# Patient Record
Sex: FEMALE | Race: WHITE | Employment: OTHER | URBAN - METROPOLITAN AREA
[De-identification: names, ages, dates, MRNs, and addresses within clinical notes are randomized per-mention and may not be internally consistent; named-entity substitution may affect disease eponyms.]

---

## 2017-01-12 ENCOUNTER — MEDICAL CORRESPONDENCE (OUTPATIENT)
Dept: HEALTH INFORMATION MANAGEMENT | Facility: CLINIC | Age: 47
End: 2017-01-12

## 2017-01-20 ENCOUNTER — HOSPITAL ENCOUNTER (OUTPATIENT)
Dept: ULTRASOUND IMAGING | Facility: CLINIC | Age: 47
Discharge: HOME | End: 2017-01-20
Attending: SPECIALIST | Admitting: SPECIALIST
Payer: COMMERCIAL

## 2017-01-20 DIAGNOSIS — E04.2 MULTINODULAR GOITER: ICD-10-CM

## 2017-01-20 PROCEDURE — 76536 US EXAM OF HEAD AND NECK: CPT

## 2019-03-15 NOTE — PROGRESS NOTES
SUBJECTIVE:   CC: Kenneth Bañuelos is an 48 year old woman who presents for preventive health visit.     Physical   Annual:     Getting at least 3 servings of Calcium per day:  Yes    Bi-annual eye exam:  NO    Dental care twice a year:  NO    Sleep apnea or symptoms of sleep apnea:  Daytime drowsiness    Diet:  Vegetarian/vegan    Frequency of exercise:  2-3 days/week    Duration of exercise:  45-60 minutes    Taking medications regularly:  Yes    Medication side effects:  None    Additional concerns today:  No    PHQ-2 Total Score: 0    She has a long history of difficulty with sleeping.  She tried Ambien in the past but the immediate release Ambien did not allow her to sleep through the entire night so she tried extended release Ambien and it made her too sleepy in the morning.  She did try trazodone at one point in time and this worked well for her.  She would like to try trazodone again.    She is having pain in multiple joints and would like this evaluated.  We will evaluate this at a future visit due to time constraints today.    Today's PHQ-2 Score:   PHQ-2 (  [employer]) 3/20/2019   Q1: Little interest or pleasure in doing things 0   Q2: Feeling down, depressed or hopeless 0   PHQ-2 Score 0   Q1: Little interest or pleasure in doing things Not at all   Q2: Feeling down, depressed or hopeless Not at all   PHQ-2 Score 0       Abuse: Current or Past(Physical, Sexual or Emotional)- No  Do you feel safe in your environment? Yes    Social History     Tobacco Use     Smoking status: Former Smoker     Packs/day: 0.50     Years: 2.00     Pack years: 1.00     Last attempt to quit: 10/28/1989     Years since quittin.4     Smokeless tobacco: Never Used   Substance Use Topics     Alcohol use: Yes     Comment: red wine - few nights weekly     Alcohol Use 3/20/2019   If you drink alcohol do you typically have greater than 3 drinks per day OR greater than 7 drinks per week? Yes     Reviewed orders with patient.   Reviewed health maintenance and updated orders accordingly - Yes  Labs reviewed in EPIC  BP Readings from Last 3 Encounters:   19 122/84   01/27/15 119/77   01/12/15 112/77    Wt Readings from Last 3 Encounters:   19 78.2 kg (172 lb 6.4 oz)   01/27/15 74.9 kg (165 lb 3.2 oz)   01/12/15 76.7 kg (169 lb)                  Patient Active Problem List   Diagnosis     CARDIOVASCULAR SCREENING; LDL GOAL LESS THAN 160     Immunization not carried out because of patient refusal     Thyroid enlargement     Hashimoto's thyroiditis     Multinodular goiter     Vegetarianism     Bladder spasm     Urinary frequency     Sleep disturbance     History of vitamin D deficiency     History of non anemic vitamin B12 deficiency     Low back pain     Right leg paresthesias     Repetitive intrusions of sleep     Insomnia     Hypersomnia     Persistent disorder of initiating or maintaining sleep     Past Surgical History:   Procedure Laterality Date     RECONSTRUCT BREAST, IMPLANT PROSTHESIS, COMBINED       [facility]         Social History     Tobacco Use     Smoking status: Former Smoker     Packs/day: 0.50     Years: 2.00     Pack years: 1.00     Last attempt to quit: 10/28/1989     Years since quittin.4     Smokeless tobacco: Never Used   Substance Use Topics     Alcohol use: Yes     Comment: red wine - few nights weekly     Family History   Problem Relation Age of Onset     Diabetes Mother         30's insulin dependent     Circulatory Mother         heart valve replacement     Thyroid Disease Sister         Hashimoto's     Other - See Comments Sister         Insomnia     Cerebrovascular Disease No family hx of      Hypertension No family hx of          Current Outpatient Medications   Medication Sig Dispense Refill     Multiple Vitamin (MULTI-VITAMIN PO) Take  by mouth daily.       SYNTHROID 112 MCG tablet Take 112 mcg by mouth daily  1     traZODone (DESYREL) 50 MG tablet Take 1 tablet (50 mg) by mouth At  Bedtime May increase to 2 tablets daily. 90 tablet 3     levothyroxine (SYNTHROID) 100 MCG tablet Take 1 tablet by mouth daily. Dr. Brandon Fields       melatonin 3 MG tablet Take 1 tablet (3 mg) by mouth nightly as needed for sleep       No Known Allergies    Mammogram Screening: Patient over age 50, mutual decision to screen reflected in health maintenance.    Pertinent mammograms are reviewed under the imaging tab.  History of abnormal Pap smear: NO - age 30-65 PAP every 5 years with negative HPV co-testing recommended  PAP / HPV 2014   PAP NIL NIL     Reviewed and updated as needed this visit by clinical staff  Tobacco  Allergies  Meds  Med Hx  Surg Hx  Fam Hx  Soc Hx    iron    Reviewed and updated as needed this visit by Provider        Past Medical History:   Diagnosis Date     Hashimoto's disease      Hypersomnia, unspecified     sleep study     Immunization not carried out because of patient refusal 2011    tdap     Insomnia, unspecified     sleep study     Persistent disorder of initiating or maintaining sleep     sleep study     Repetitive intrusions of sleep     sleep study     Shingles 2008    hand - resolved without complications     Vitamin B12 deficiency 2012     Vitamin D deficiency       Past Surgical History:   Procedure Laterality Date     RECONSTRUCT BREAST, IMPLANT PROSTHESIS, COMBINED       WISDOM [facility]       Obstetric History       T2      L2     SAB3   TAB1   Ectopic0   Multiple0   Live Births0       # Outcome Date GA Lbr Celestino/2nd Weight Sex Delivery Anes PTL Lv   6 Term            5 Term            4 TAB            3 SAB            2 SAB            1 SAB                   Review of Systems   Constitutional: Negative for chills and fever.   HENT: Negative for congestion, ear pain, hearing loss and sore throat.    Eyes: Positive for visual disturbance. Negative for pain.   Respiratory: Negative for cough and shortness of breath.   "  Cardiovascular: Negative for chest pain, palpitations and peripheral edema.   Gastrointestinal: Negative for abdominal pain, constipation, diarrhea, heartburn, hematochezia and nausea.   Breasts:  Negative for tenderness, breast mass and discharge.   Genitourinary: Positive for frequency. Negative for dysuria, genital sores, hematuria, pelvic pain, urgency, vaginal bleeding and vaginal discharge.   Musculoskeletal: Positive for arthralgias. Negative for joint swelling and myalgias.   Skin: Negative for rash.   Neurological: Negative for dizziness, weakness, headaches and paresthesias.   Psychiatric/Behavioral: Negative for mood changes. The patient is nervous/anxious.         OBJECTIVE:   Resp 16   Ht 1.714 m (5' 7.48\")   Wt 78.2 kg (172 lb 6.4 oz)   LMP 03/04/2019 (Approximate)   BMI 26.62 kg/m    Physical Exam  GENERAL: healthy, alert and no distress  EYES: Eyes grossly normal to inspection, PERRL and conjunctivae and sclerae normal  HENT: ear canals and TM's normal, nose and mouth without ulcers or lesions  NECK: no adenopathy, no asymmetry, masses, or scars and thyroid normal to palpation  RESP: lungs clear to auscultation - no rales, rhonchi or wheezes  BREAST: normal without masses, tenderness or nipple discharge and no palpable axillary masses or adenopathy  CV: regular rate and rhythm, normal S1 S2, no S3 or S4, no murmur, click or rub, no peripheral edema and peripheral pulses strong  ABDOMEN: soft, nontender, no hepatosplenomegaly, no masses and bowel sounds normal   (female): normal female external genitalia, normal urethral meatus, vaginal mucosa pink, moist, well rugated, and normal cervix/adnexa/uterus without masses or discharge  MS: no gross musculoskeletal defects noted, no edema  SKIN: no suspicious lesions or rashes  NEURO: Normal strength and tone, mentation intact and speech normal  PSYCH: mentation appears normal, affect normal/bright, mildly anxious    Diagnostic Test Results:  No " "results found for this or any previous visit (from the past 24 hour(s)).    ASSESSMENT/PLAN:   1. Encounter for routine adult health examination without abnormal findings  Healthy female adult    2. Screen for colon cancer  - Fecal colorectal cancer screen (FIT); Future    3. Visit for screening mammogram  - MA SCREENING DIGITAL BILAT - Future  (s+30); Future    4. Lipid screening  - Lipid panel reflex to direct LDL Fasting; Future    5. Insomnia, unspecified type  Will restart trazodone. Follow up if medication is not effective.  - traZODone (DESYREL) 50 MG tablet; Take 1 tablet (50 mg) by mouth At Bedtime May increase to 2 tablets daily.  Dispense: 90 tablet; Refill: 3    6. Screening for malignant neoplasm of cervix  - Pap imaged thin layer screen with HPV - recommended age 30 - 65 years (select HPV order below)  - HPV High Risk Types DNA Cervical    7. Screening for diabetes mellitus  - **Basic metabolic panel FUTURE anytime; Future    8. Vitamin B12 deficiency (non anemic)  - Vitamin B12; Future    9. Vitamin D deficiency  - Vitamin D Deficiency; Future    10. Iron deficiency anemia secondary to inadequate dietary iron intake  - Iron and iron binding capacity; Future  - Ferritin; Future  - CBC with platelets; Future    11. Polyarthralgia  Will address at future visit.    COUNSELING:  Reviewed preventive health counseling, as reflected in patient instructions       Regular exercise       Healthy diet/nutrition       Immunizations    Declined: Influenza and Td due to Concerns about side effects/safety               Colon cancer screening    BP Readings from Last 1 Encounters:   01/27/15 119/77     Estimated body mass index is 26.62 kg/m  as calculated from the following:    Height as of this encounter: 1.714 m (5' 7.48\").    Weight as of this encounter: 78.2 kg (172 lb 6.4 oz).      Weight management plan: Discussed healthy diet and exercise guidelines     reports that she quit smoking about 29 years ago. She has " a 1.00 pack-year smoking history. she has never used smokeless tobacco.      Counseling Resources:  ATP IV Guidelines  Pooled Cohorts Equation Calculator  Breast Cancer Risk Calculator  FRAX Risk Assessment  ICSI Preventive Guidelines  Dietary Guidelines for Americans, 2010  USDA's MyPlate  ASA Prophylaxis  Lung CA Screening    JENNY Das [facility]

## 2019-03-15 NOTE — PATIENT INSTRUCTIONS
Trazodone prescription sent to the pharmacy. Start out with one tablet at bedtime. If needed you can increase the dose to 2 tablets at bedtime.    Blood work orders are in so that you can have these done when you go to the hospital for your thyroid ultrasound.  For the cholesterol test, you will need to fast for 10 hours beforehand. You can drink water and black coffee while fasting.    Schedule mammogram at the [facility] by calling [phone].    SARA SureshC    Preventive Health Recommendations  Female Ages 50 - 64    Yearly exam: See your health care provider every year in order to  o Review health changes.   o Discuss preventive care.    o Review your medicines if your doctor has prescribed any.      Get a Pap test every three years (unless you have an abnormal result and your provider advises testing more often).    If you get Pap tests with HPV test, you only need to test every 5 years, unless you have an abnormal result.     You do not need a Pap test if your uterus was removed (hysterectomy) and you have not had cancer.    You should be tested each year for STDs (sexually transmitted diseases) if you're at risk.     Have a mammogram every 1 to 2 years.    Have a colonoscopy at age 50, or have a yearly FIT test (stool test). These exams screen for colon cancer.      Have a cholesterol test every 5 years, or more often if advised.    Have a diabetes test (fasting glucose) every three years. If you are at risk for diabetes, you should have this test more often.     If you are at risk for osteoporosis (brittle bone disease), think about having a bone density scan (DEXA).    Shots: Get a flu shot each year. Get a tetanus shot every 10 years.    Nutrition:     Eat at least 5 servings of fruits and vegetables each day.    Eat whole-grain bread, whole-wheat pasta and brown rice instead of white grains and rice.    Get adequate Calcium and Vitamin D.     Lifestyle    Exercise at least  150 minutes a week (30 minutes a day, 5 days a week). This will help you control your weight and prevent disease.    Limit alcohol to one drink per day.    No smoking.     Wear sunscreen to prevent skin cancer.     See your dentist every six months for an exam and cleaning.    See your eye doctor every 1 to 2 years.

## 2019-03-20 ENCOUNTER — OFFICE VISIT (OUTPATIENT)
Dept: FAMILY MEDICINE | Facility: OTHER | Age: 49
End: 2019-03-20
Payer: COMMERCIAL

## 2019-03-20 VITALS
HEART RATE: 96 BPM | TEMPERATURE: 98.9 F | HEIGHT: 67 IN | SYSTOLIC BLOOD PRESSURE: 122 MMHG | OXYGEN SATURATION: 100 % | WEIGHT: 172.4 LBS | RESPIRATION RATE: 16 BRPM | BODY MASS INDEX: 27.06 KG/M2 | DIASTOLIC BLOOD PRESSURE: 84 MMHG

## 2019-03-20 DIAGNOSIS — E53.8 VITAMIN B12 DEFICIENCY (NON ANEMIC): ICD-10-CM

## 2019-03-20 DIAGNOSIS — M25.50 POLYARTHRALGIA: ICD-10-CM

## 2019-03-20 DIAGNOSIS — Z12.31 VISIT FOR SCREENING MAMMOGRAM: ICD-10-CM

## 2019-03-20 DIAGNOSIS — Z12.11 SCREEN FOR COLON CANCER: ICD-10-CM

## 2019-03-20 DIAGNOSIS — Z13.1 SCREENING FOR DIABETES MELLITUS: ICD-10-CM

## 2019-03-20 DIAGNOSIS — Z13.220 LIPID SCREENING: ICD-10-CM

## 2019-03-20 DIAGNOSIS — D50.8 IRON DEFICIENCY ANEMIA SECONDARY TO INADEQUATE DIETARY IRON INTAKE: ICD-10-CM

## 2019-03-20 DIAGNOSIS — Z00.00 ENCOUNTER FOR ROUTINE ADULT HEALTH EXAMINATION WITHOUT ABNORMAL FINDINGS: Primary | ICD-10-CM

## 2019-03-20 DIAGNOSIS — G47.00 INSOMNIA, UNSPECIFIED TYPE: ICD-10-CM

## 2019-03-20 DIAGNOSIS — E55.9 VITAMIN D DEFICIENCY: ICD-10-CM

## 2019-03-20 DIAGNOSIS — Z12.4 SCREENING FOR MALIGNANT NEOPLASM OF CERVIX: ICD-10-CM

## 2019-03-20 PROCEDURE — G0145 SCR C/V CYTO,THINLAYER,RESCR: HCPCS | Performed by: STUDENT IN AN ORGANIZED HEALTH CARE EDUCATION/TRAINING PROGRAM

## 2019-03-20 PROCEDURE — 99386 PREV VISIT NEW AGE 40-64: CPT | Performed by: STUDENT IN AN ORGANIZED HEALTH CARE EDUCATION/TRAINING PROGRAM

## 2019-03-20 PROCEDURE — 87624 HPV HI-RISK TYP POOLED RSLT: CPT | Performed by: STUDENT IN AN ORGANIZED HEALTH CARE EDUCATION/TRAINING PROGRAM

## 2019-03-20 RX ORDER — LEVOTHYROXINE SODIUM 112 MCG
112 TABLET ORAL DAILY
Refills: 1 | COMMUNITY
Start: 2019-02-06 | End: 2019-04-15

## 2019-03-20 RX ORDER — TRAZODONE HYDROCHLORIDE 50 MG/1
50 TABLET, FILM COATED ORAL AT BEDTIME
Qty: 90 TABLET | Refills: 3 | Status: SHIPPED | OUTPATIENT
Start: 2019-03-20 | End: 2019-04-15 | Stop reason: SINTOL

## 2019-03-20 ASSESSMENT — ENCOUNTER SYMPTOMS
JOINT SWELLING: 0
NAUSEA: 0
WEAKNESS: 0
DYSURIA: 0
HEADACHES: 0
EYE PAIN: 0
HEMATOCHEZIA: 0
CHILLS: 0
HEMATURIA: 0
SHORTNESS OF BREATH: 0
HEARTBURN: 0
COUGH: 0
DIZZINESS: 0
PALPITATIONS: 0
ABDOMINAL PAIN: 0
BREAST MASS: 0
FREQUENCY: 1
ARTHRALGIAS: 1
PARESTHESIAS: 0
MYALGIAS: 0
NERVOUS/ANXIOUS: 1
SORE THROAT: 0
FEVER: 0
DIARRHEA: 0
CONSTIPATION: 0

## 2019-03-20 ASSESSMENT — MIFFLIN-ST. JEOR: SCORE: 1442.25

## 2019-03-20 NOTE — LETTER
March 27, 2019    Kenenth Bañuelos  [address] [id number]    Dear ,  This letter is regarding your recent Pap smear (cervical cancer screening) and Human Papillomavirus (HPV) test.  We are happy to inform you that your Pap smear result is normal. Cervical cancer is closely linked with certain types of HPV. Your results showed no evidence of high-risk HPV.  Therefore we recommend you return in 5 years for your next pap smear and HPV test.  You will still need to return to the clinic every year for an annual exam and other preventive tests.  If you have additional questions regarding this result, please call our registered nurse, Liz at [phone].  Sincerely,    JENNY Das CNP/[facility]

## 2019-03-22 LAB
COPATH REPORT: NORMAL
PAP: NORMAL

## 2019-03-25 LAB
FINAL DIAGNOSIS: NORMAL
HPV HR 12 DNA CVX QL NAA+PROBE: NEGATIVE
HPV16 DNA SPEC QL NAA+PROBE: NEGATIVE
HPV18 DNA SPEC QL NAA+PROBE: NEGATIVE
SPECIMEN DESCRIPTION: NORMAL
SPECIMEN SOURCE CVX/VAG CYTO: NORMAL

## 2019-04-08 ENCOUNTER — HOSPITAL ENCOUNTER (OUTPATIENT)
Dept: ULTRASOUND IMAGING | Facility: CLINIC | Age: 49
Discharge: HOME | End: 2019-04-08
Attending: SPECIALIST | Admitting: SPECIALIST
Payer: COMMERCIAL

## 2019-04-08 DIAGNOSIS — E55.9 VITAMIN D DEFICIENCY: ICD-10-CM

## 2019-04-08 DIAGNOSIS — D50.8 IRON DEFICIENCY ANEMIA SECONDARY TO INADEQUATE DIETARY IRON INTAKE: ICD-10-CM

## 2019-04-08 DIAGNOSIS — E53.8 VITAMIN B12 DEFICIENCY (NON ANEMIC): ICD-10-CM

## 2019-04-08 DIAGNOSIS — E04.2 MULTINODULAR GOITER: ICD-10-CM

## 2019-04-08 DIAGNOSIS — Z13.1 SCREENING FOR DIABETES MELLITUS: ICD-10-CM

## 2019-04-08 DIAGNOSIS — Z13.220 LIPID SCREENING: ICD-10-CM

## 2019-04-08 LAB
ANION GAP SERPL CALCULATED.3IONS-SCNC: 9 MMOL/L (ref 3–14)
BUN SERPL-MCNC: 11 MG/DL (ref 7–30)
CALCIUM SERPL-MCNC: 8.6 MG/DL (ref 8.5–10.1)
CHLORIDE SERPL-SCNC: 105 MMOL/L (ref 94–109)
CHOLEST SERPL-MCNC: 211 MG/DL
CO2 SERPL-SCNC: 25 MMOL/L (ref 20–32)
CREAT SERPL-MCNC: 0.72 MG/DL (ref 0.52–1.04)
DEPRECATED CALCIDIOL+CALCIFEROL SERPL-MC: 32 UG/L (ref 20–75)
ERYTHROCYTE [DISTWIDTH] IN BLOOD BY AUTOMATED COUNT: 12.8 % (ref 10–15)
FERRITIN SERPL-MCNC: 12 NG/ML (ref 8–252)
GFR SERPL CREATININE-BSD FRML MDRD: >90 ML/MIN/{1.73_M2}
GLUCOSE SERPL-MCNC: 93 MG/DL (ref 70–99)
HCT VFR BLD AUTO: 38.5 % (ref 35–47)
HDLC SERPL-MCNC: 92 MG/DL
HGB BLD-MCNC: 12.7 G/DL (ref 11.7–15.7)
IRON SATN MFR SERPL: 17 % (ref 15–46)
IRON SERPL-MCNC: 64 UG/DL (ref 35–180)
LDLC SERPL CALC-MCNC: 105 MG/DL
MCH RBC QN AUTO: 33.3 PG (ref 26.5–33)
MCHC RBC AUTO-ENTMCNC: 33 G/DL (ref 31.5–36.5)
MCV RBC AUTO: 101 FL (ref 78–100)
NONHDLC SERPL-MCNC: 119 MG/DL
PLATELET # BLD AUTO: 275 10E9/L (ref 150–450)
POTASSIUM SERPL-SCNC: 3.8 MMOL/L (ref 3.4–5.3)
RBC # BLD AUTO: 3.81 10E12/L (ref 3.8–5.2)
SODIUM SERPL-SCNC: 139 MMOL/L (ref 133–144)
TIBC SERPL-MCNC: 375 UG/DL (ref 240–430)
TRIGL SERPL-MCNC: 70 MG/DL
VIT B12 SERPL-MCNC: 537 PG/ML (ref 193–986)
WBC # BLD AUTO: 6 10E9/L (ref 4–11)

## 2019-04-08 PROCEDURE — 83550 IRON BINDING TEST: CPT | Performed by: STUDENT IN AN ORGANIZED HEALTH CARE EDUCATION/TRAINING PROGRAM

## 2019-04-08 PROCEDURE — 82306 VITAMIN D 25 HYDROXY: CPT | Performed by: STUDENT IN AN ORGANIZED HEALTH CARE EDUCATION/TRAINING PROGRAM

## 2019-04-08 PROCEDURE — 82728 ASSAY OF FERRITIN: CPT | Performed by: STUDENT IN AN ORGANIZED HEALTH CARE EDUCATION/TRAINING PROGRAM

## 2019-04-08 PROCEDURE — 80061 LIPID PANEL: CPT | Performed by: STUDENT IN AN ORGANIZED HEALTH CARE EDUCATION/TRAINING PROGRAM

## 2019-04-08 PROCEDURE — 36415 COLL VENOUS BLD VENIPUNCTURE: CPT | Performed by: STUDENT IN AN ORGANIZED HEALTH CARE EDUCATION/TRAINING PROGRAM

## 2019-04-08 PROCEDURE — 76536 US EXAM OF HEAD AND NECK: CPT

## 2019-04-08 PROCEDURE — 82607 VITAMIN B-12: CPT | Performed by: STUDENT IN AN ORGANIZED HEALTH CARE EDUCATION/TRAINING PROGRAM

## 2019-04-08 PROCEDURE — 85027 COMPLETE CBC AUTOMATED: CPT | Performed by: STUDENT IN AN ORGANIZED HEALTH CARE EDUCATION/TRAINING PROGRAM

## 2019-04-08 PROCEDURE — 83540 ASSAY OF IRON: CPT | Performed by: STUDENT IN AN ORGANIZED HEALTH CARE EDUCATION/TRAINING PROGRAM

## 2019-04-08 PROCEDURE — 80048 BASIC METABOLIC PNL TOTAL CA: CPT | Performed by: STUDENT IN AN ORGANIZED HEALTH CARE EDUCATION/TRAINING PROGRAM

## 2019-04-10 NOTE — PROGRESS NOTES
SUBJECTIVE:   Kenneth Swift is a 48 year old female who presents to clinic today for the following health issues:      History of Present Illness     Hypothyroidism:     Since last visit, patient describes the following symptoms::  Fatigue and Weight gain    Weight gain::  5 lbs.    Diet:  Vegetarian/vegan  Frequency of exercise:  2-3 days/week  Duration of exercise:  30-45 minutes  Taking medications regularly:  Yes  Medication side effects:  None  Additional concerns today:  Yes    She sees Dr. Galdamez her endocrinologist for Hashimoto's.  She had her TSH checked within the past month and it was normal and she continues on the same dose of levothyroxine.  She continues to struggle with daytime fatigue and insomnia.  She tried trazodone but it gave her numbness and tingling in her arm so she stopped taking it.  She will take Benadryl which is effective but she is wondering about whether this is safe on a long-term basis.  She tried melatonin in the past but it stopped working at one point so she stopped taking it.  She would like to try a different prescription medication.  She saw a sleep specialist in the past and he told her to sleep last hours per night around 4-5 but this has not helped.  She denies snoring.    Medication Followup of Trazadone    Taking Medication as prescribed: yes    Side Effects:  Tingling and numbness    Medication Helping Symptoms:  yes     Joint Pain    Onset: Roughly 1 year    Description:   Location: Shoulders, knees, elbows  Character: Dull ache    Intensity: 2/10    Progression of Symptoms: intermittent    Accompanying Signs & Symptoms:  Other symptoms: none    History:   Previous similar pain: no       Precipitating factors:   Trauma or overuse: no     Alleviating factors:  Improved by: nothing    Therapies Tried and outcome: Aspirin - once in a while, seems to help    She  describes the pain as a mild aching pain during the day and her shoulders knees and elbows.  The pain is  worse at night.  When she gets up to go to the bathroom it will sometimes be difficult to get off the toilet because her knees hurt.  She has more pain in her right shoulder that feels deep in the joint and also is tender along the shoulder blade at times.  She is right-handed.  Her elbows feel like they ache from within. 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 They are planning to have a concrete floor in the wood shop.  She has not noticed any swelling redness or tenderness in any of her joints.  She currently works out 3 days a week at the gym and participates in boot camp, a cardio class and a bar class.  She has been doing this for 8 or 9 years.  She has been working on weight loss but is having difficulty losing weight.  She is currently eating under 1500 danica/day.  She finds if she eats around 1200 danica/day she is able to lose a few pounds but as soon as she goes back to 1500-calorie she gains the weight back.  She drinks 1-2 glasses of wine per night and includes this and her daily calorie count.    Additional history: as documented    Reviewed and updated as needed this visit by clinical staff         Reviewed and updated as needed this visit by Provider         Patient Active Problem List   Diagnosis     CARDIOVASCULAR SCREENING; LDL GOAL LESS THAN 160     Immunization not carried out because of patient refusal     Thyroid enlargement     Hashimoto's thyroiditis     Multinodular goiter     Vegetarianism     Bladder spasm     Urinary frequency     Sleep disturbance     History of vitamin D deficiency     History of non anemic vitamin B12 deficiency     Low back pain     Right leg paresthesias     Repetitive intrusions of sleep     Insomnia     Hypersomnia     Persistent disorder of initiating or maintaining sleep     Past Surgical History:   Procedure Laterality Date     RECONSTRUCT BREAST, IMPLANT PROSTHESIS[facility]         Social History     Tobacco Use     Smoking status: Former Smoker     Packs/day: 0.50     Years: 2.00     Pack years: 1.00     Last attempt to quit: 10/28/1989     Years since quittin.4     Smokeless tobacco: Never Used   Substance Use Topics     Alcohol use: Yes     Comment: red wine - few nights weekly     Family History   Problem Relation Age of Onset     Diabetes Mother         30's insulin dependent     Circulatory Mother         heart valve replacement     Thyroid Disease Sister         Hashimoto's     Other - See Comments Sister         Insomnia     Cerebrovascular Disease No family hx of      Hypertension No family hx of          Current Outpatient Medications   Medication Sig Dispense Refill     amitriptyline (ELAVIL) 25 MG tablet Take 0.5-1 tablets (12.5-25 mg) by mouth At Bedtime 30 tablet 1     melatonin 3 MG tablet Take 1 tablet (3 mg) by mouth nightly as needed for sleep       Multiple Vitamin (MULTI-VITAMIN PO) Take  by mouth daily.       SYNTHROID 112 MCG tablet Take 1 tablet (112 mcg) by mouth daily By Dr. Brandon Fields endocrinologist       No Known Allergies  BP Readings from Last 3 Encounters:   04/15/19 126/74   19 122/84   01/27/15 119/77    Wt Readings from Last 3 Encounters:   04/15/19 81.2 kg (179 lb)   19 78.2 kg (172 lb 6.4 oz)   01/27/15 74.9 kg (165 lb 3.2 oz)                  Labs reviewed in EPIC    ROS:  Constitutional, HEENT, cardiovascular, pulmonary, gi and gu systems are negative, except as otherwise noted.    OBJECTIVE:     /74   Pulse 80   Temp 97.9  F (36.6  C) (Temporal)   Resp 16   Ht 1.714 m (5' 7.48\")   Wt 81.2 kg (179 lb)   BMI 27.64 kg/m    Body mass index is 27.64 kg/m .  GENERAL: alert and no distress  EYES: Eyes grossly normal to inspection and conjunctivae and sclerae normal  NECK: no adenopathy, no asymmetry, masses, or scars and thyroid normal to palpation  RESP: lungs clear to auscultation - no rales, rhonchi " or wheezes  CV: regular rate and rhythm, normal S1 S2, no S3 or S4, no murmur, click or rub  MS:  no tenderness to palpation of bilateral shoulders, elbows and knees.  Full range of motion of all joints.  Knots in musculature along medial right scapula appreciated.  Posture exhibits rounded shoulders.  Normal strength.  SKIN: no suspicious lesions or rashes  NEURO: Normal strength and tone, mentation intact and speech normal  PSYCH: mentation appears normal, affect normal/bright    Diagnostic Test Results:  none     ASSESSMENT/PLAN:     1. Insomnia, unspecified type  Continues to have difficulty with sleep.  She did not tolerate trazodone due to side effect of numbness and tingling in her arms.  I recommended trying amitriptyline.  I did discuss with her that it can have a side effect of weight gain which she verbalizes understanding.  We discussed that she may try rotating between sleep medications since she tends to develop a tolerance to medications in the past.  She could try alternating between melatonin, amitriptyline and Benadryl or Unisom.  If this does not work I recommend that she meets with a sleep specialist to discuss other options and possibly a sleep study.  - amitriptyline (ELAVIL) 25 MG tablet; Take 0.5-1 tablets (12.5-25 mg) by mouth At Bedtime  Dispense: 30 tablet; Refill: 1    2. Polyarthralgia  Symptoms consistent with osteoarthritis although we do not have imaging to confirm this.  She does not have any symptoms that would suggest inflammatory arthritis.  I recommend that she works with physical therapy to discuss an exercise plan to strengthen the muscles that support her joints.  I also discussed with her working on posture which may help with her right shoulder pain.  She may also try taking one aspirin at bedtime as this may also have some cardio benefits along with reducing her pain so she can sleep better.  She is agreeable to this plan.  - PHYSICAL THERAPY REFERRAL; Future    3.  Hashimoto's thyroiditis  Updated dosage per her endocrinologist.  - SYNTHROID 112 MCG tablet; Take 1 tablet (112 mcg) by mouth daily By Dr. Brandon Fields endocrinologist    JENNY Das [facility]

## 2019-04-15 ENCOUNTER — OFFICE VISIT (OUTPATIENT)
Dept: FAMILY MEDICINE | Facility: OTHER | Age: 49
End: 2019-04-15
Payer: COMMERCIAL

## 2019-04-15 VITALS
SYSTOLIC BLOOD PRESSURE: 126 MMHG | DIASTOLIC BLOOD PRESSURE: 74 MMHG | RESPIRATION RATE: 16 BRPM | BODY MASS INDEX: 28.09 KG/M2 | TEMPERATURE: 97.9 F | HEIGHT: 67 IN | WEIGHT: 179 LBS | HEART RATE: 80 BPM

## 2019-04-15 DIAGNOSIS — G47.00 INSOMNIA, UNSPECIFIED TYPE: Primary | ICD-10-CM

## 2019-04-15 DIAGNOSIS — M25.50 POLYARTHRALGIA: ICD-10-CM

## 2019-04-15 DIAGNOSIS — E06.3 HASHIMOTO'S THYROIDITIS: ICD-10-CM

## 2019-04-15 PROCEDURE — 99214 OFFICE O/P EST MOD 30 MIN: CPT | Performed by: STUDENT IN AN ORGANIZED HEALTH CARE EDUCATION/TRAINING PROGRAM

## 2019-04-15 RX ORDER — LEVOTHYROXINE SODIUM 112 MCG
112 TABLET ORAL DAILY
Start: 2019-04-15 | End: 2020-12-22

## 2019-04-15 ASSESSMENT — PAIN SCALES - GENERAL: PAINLEVEL: MILD PAIN (2)

## 2019-04-15 ASSESSMENT — MIFFLIN-ST. JEOR: SCORE: 1472.19

## 2019-04-15 NOTE — PATIENT INSTRUCTIONS
"For sleep may try Unisom - look for \"doxylamine\" as the ingredient no diphenhydramine as this is Benadryl    Try amitriptyline 12.5 to 25 mg at bedtime.    Try aspirin at night before bedtime    For joint pain I recommend working with physical therapy. I placed a referral for physical therapy and you will receive a call to set up an appointment for this    "

## 2019-04-29 ENCOUNTER — HOSPITAL ENCOUNTER (OUTPATIENT)
Dept: PHYSICAL THERAPY | Facility: CLINIC | Age: 49
Setting detail: THERAPIES SERIES
End: 2019-04-29
Attending: STUDENT IN AN ORGANIZED HEALTH CARE EDUCATION/TRAINING PROGRAM
Payer: COMMERCIAL

## 2019-04-29 DIAGNOSIS — M25.50 POLYARTHRALGIA: ICD-10-CM

## 2019-04-29 PROCEDURE — 97110 THERAPEUTIC EXERCISES: CPT | Mod: GP | Performed by: PHYSICAL THERAPIST

## 2019-04-29 PROCEDURE — 97161 PT EVAL LOW COMPLEX 20 MIN: CPT | Mod: GP | Performed by: PHYSICAL THERAPIST

## 2019-04-29 NOTE — PROGRESS NOTES
04/29/19 0906   General Information   Type of Visit Initial OP Ortho PT Evaluation   Start of Care Date 04/29/19   Referring Physician JENNY Goodson CNP   Patient/Family Goals Statement Looking for HEP to work on 3x/week at home (she goes to gym classes the other 3 days of the week) in order to strengthen her muscles around her joints to decrease pain related to OA.    Orders Evaluate and Treat   Date of Order 04/15/19   Insurance Type Other  (BCBS Comp ([facility]))   Insurance Comments/Visits Authorized Send email 2 weeks prior to the 41st visit OR by 120 days, whichever comes first   Medical Diagnosis Polyarthralgia   Surgical/Medical history reviewed Yes   Precautions/Limitations no known precautions/limitations   Weight-Bearing Status - LUE full weight-bearing   Weight-Bearing Status - RUE full weight-bearing   Weight-Bearing Status - LLE full weight-bearing   Weight-Bearing Status - RLE full weight-bearing   General Information Comments PMH: Vitamin D deficiency, Vitamin B12 deficiency, Shingles, Repetitive intrusions of sleep, Insomnia, Hypersomnia, Hashimoto's disease.  PSH: Breast reconstruction       Present No   Body Part(s)   Body Part(s) Knee;Shoulder   Presentation and Etiology   Pertinent history of current problem (include personal factors and/or comorbidities that impact the POC) Patient reports pain in her knees, elbow, and shoulders - more on the R side than L side, she's not sure why.  There was no injury or event that caused the pain, they just gradually started bothering her more and more.  She has been told her pain and symptoms are most likely the beginning of OA and it was recommended that she see PT to develop HEP for strengthening of the muscles around the joints to decrease pain and reduce future functional deficits.  She currently goes to her local gym and attends exercise classes 3x/week, but is interested in exercises she can do at home on her own  "the other 3 days of the week.     Impairments A. Pain;F. Decreased strength and endurance   Functional Limitations perform activities of daily living;perform desired leisure / sports activities   Symptom Location Knee: \"Right inside the knee cap\"; Shoulder - \"Inside the joint\"   How/Where did it occur From insidious onset  (Both shoulder and knee )   Onset date of current episode/exacerbation 01/01/14  (Knee - several years of pain; Shoulder - one year of pain)   Chronicity Chronic  (Both knee and shoulder)   Pain rating (0-10 point scale) Best (/10);Worst (/10)   Best (/10) 0/10  (Both knee and shoulder)   Worst (/10) 4/10  (Both knee and shoulder)   Pain quality B. Dull;C. Aching  (Both knee and shoulder)   Frequency of pain/symptoms C. With activity  (Both knee and shoulder)   Pain/symptoms are: Worse in the morning  (Both knee and shoulder)   Pain/symptoms exacerbated by M. Other   Pain exacerbation comment Knee - \"Moving a certain way (like getting out of the car), getting on/off toilet in the middle of night (during the day it's not as bad), going up/down stairs\".  Shoulder - \"Certain exercises aggravate it, like holding a plank, also sometimes difficulty putting on a coat or other dressing tasks\"   Pain/symptoms eased by K. Other   Pain eased by comment Knee - \"Wearing compression while exercising, using ice packs (hasn't tried heat)\".  Shoulder - Self massage to shoulder blade area   Progression of symptoms since onset: Worsened  (Both knee and shoulder)   Current / Previous Interventions   Diagnostic Tests:   (None)   Prior Level of Function   Prior Level of Function-Mobility Independent   Prior Level of Function-ADLs Independent   Current Level of Function   Patient role/employment history A. Employed   Employment Comments Self-employed - artist   [facility]   Current equipment-Gait/Locomotion None   Current equipment-ADL None   Fall Risk Screen   Fall screen completed by PT   Have " you fallen 2 or more times in the past year? Yes  (Slipped on ice in the winter)   Have you fallen and had an injury in the past year? No   Is patient a fall risk? No   Knee Objective Findings   Side (if bilateral, select both right and left) Right   Integumentary  No issues noted   Gait/Locomotion Appropriate speed, lateral weight shift, slight decrease in B pelvic rotation   Knee/Hip Strength Comments R hip flexion 4-/5, L hip flexion 4/5   Anterior Drawer Test Negative    Posterior Drawer Test Negative    Varus Stress Test Negative    Valgus Stress Test Negative    Palpation No areas of increased pain/tightness (except for B IT bands)   Accessory Motion/Joint Mobility WNL   Right Knee Extension AROM 0   Right Knee Flexion AROM Unable to assess   Right Knee Flexion Strength R knee flexion 4-/5, L knee flexion 4/5   Right Knee Extension Strength R knee extension 3+/5, painful; L knee extension 4+/5   Right Hip Abduction Strength B hip abduction 5/5   Right ITB Flexibility B tightness; Avril's and Noble tests negative B   Shoulder Objective Findings   Side (if bilateral, select both right and left) Right   Observation Patient presents as comfortable throughout session, not in extreme pain or distress   Integumentary  No issues noted   Cervical Screen (ROM, quadrant) Spurling's negative bilaterally   Scapulothoracic Rhythm Minimal winging on R side    Neer's Test Negative   Magaña-Abhi Test Negative   Bursa Test Negative   Load and Shift Test Negative   Shoulder Special Tests Comments Lift off test negative B   Palpation B tightness noted upper traps; otherwise no tenderness or painful areas    Accessory Motion/Joint Mobility WNL   Right Shoulder Flexion AROM R 136 deg, L 142 deg   Right Shoulder Abduction AROM R 172 deg, L 172 deg    Right Shoulder ER AROM R 58 deg, L 71 deg   Right Shoulder IR AROM R T10, L T7   Planned Therapy Interventions   Planned Therapy Interventions joint mobilization;manual  "therapy;neuromuscular re-education;ROM;strengthening;stretching   Planned Modality Interventions   Planned Modality Interventions Comments Modalities as needed for symptom relief   Clinical Impression   Criteria for Skilled Therapeutic Interventions Met yes, treatment indicated   PT Diagnosis Pain and decreased strength in R shoulder and knee   Influenced by the following impairments Pain with activity, decreased strength    Functional limitations due to impairments Knee pain when moving a certain way (like getting out of the car), getting on/off toilet in the middle of the night (during the day it's not as bad because her muscles are \"warmed up\"), going up/down stairs.  Shoulder pain during certain exercises, such as holding a plank, and certain dressing tasks.     Clinical Presentation Stable/Uncomplicated   Clinical Presentation Rationale Based on observation, history, evaluation and clinical judgment.    Clinical Decision Making (Complexity) Low complexity   Therapy Frequency 1 time/week   Predicted Duration of Therapy Intervention (days/wks) 6 weeks   Risk & Benefits of therapy have been explained Yes   Patient, Family & other staff in agreement with plan of care Yes   Clinical Impression Comments Patient presents with signs and symptoms consistent with referring diagnosis of polyarthralgia.  She demonstrates R knee and shoulder pain with certain activities, decreased R shoulder AROM, impair strength.  Functionally, she has increased pain and difficulty with moving a certain way (such as getting out of the car), getting on/off toilet in the middle of the night (during the day it's not as bad because her muscles are \"warmed up\"), going up/down stairs, holding a plank exercise, certain dressing tasks.  Patient will benefit from skilled physical therapy interventions to address physical impairments for return to functional activities.    Education Assessment   Preferred Learning Style " Listening;Reading;Demonstration;Pictures/video   Barriers to Learning No barriers   ORTHO GOALS   PT Ortho Eval Goals 1;2;3   Ortho Goal 1   Goal Identifier 1   Goal Description Patient will increase R shoulder flexion AROM to 170 degrees in order to improve ability to participate in functional activities   Target Date 06/16/19   Ortho Goal 2   Goal Identifier 2   Goal Description Patient will demonstrate R knee extension strength of 4/5 or greater in order to decrease pain with functional activities    Target Date 06/16/19   Ortho Goal 3   Goal Identifier 3   Goal Description Patient will demonstrate ability to correctly perform HEP in order to prepare for long term success after discharge from PT.     Target Date 06/16/19   Total Evaluation Time   PT Ronald, Low Complexity Minutes (63350) 40       Thank you for your referral.    Ellie Shepard, PT, DPT, CLT  [phone]  [facility]

## 2019-05-22 ENCOUNTER — HOSPITAL ENCOUNTER (OUTPATIENT)
Dept: PHYSICAL THERAPY | Facility: CLINIC | Age: 49
Setting detail: THERAPIES SERIES
End: 2019-05-22
Attending: STUDENT IN AN ORGANIZED HEALTH CARE EDUCATION/TRAINING PROGRAM
Payer: COMMERCIAL

## 2019-05-22 PROCEDURE — 97110 THERAPEUTIC EXERCISES: CPT | Mod: GP | Performed by: PHYSICAL THERAPIST

## 2019-06-26 NOTE — ADDENDUM NOTE
Encounter addended by: Ellie Shepard, PT on: 6/26/2019 3:35 PM   Actions taken: Sign clinical note, Episode resolved

## 2019-06-26 NOTE — PROGRESS NOTES
Outpatient Physical Therapy Discharge Note     Patient: Kenneth Swift  : 1970    Beginning/End Dates of Reporting Period:  2019 to 2019    Referring Provider: JENNY Goodson CNP    Therapy Diagnosis: Pain and decreased strength in R shoulder and knee     Client Self Report: Patient has been doing well - has been completing HEP and likes the leg exercises, thinks they've been very helpful.  She's going to quit going to the gym for the summer, so she's glad to have a routine she can do at home.  Is hoping to go over some shoulder exercises today too.      Objective Measurements  Objective Measure: LEFS  Details: Lower Extremity Functional Scale (LEFS) assesses the patients level of difficulty with various activities. The higher the score, the greater the level of function a patient demonstrates. Pt scored 64 points out of 80 possible indicating patient is at 80% of maximal function. MCID is 9 points. LEFS is validated for patients 18 years and older, and if completed by a younger patient, is done to help determine functional deficits and activity limitations for goal use.    Objective Measure: SPADI  Details: Shoulder Pain & Disability Index (SPADI): Patient Score: 13.08% total SPADI score (17 points out of 130); 24% total pain score (12 points out of 50); 6.25% total disability score (5 points out of 80). Higher percentages demonstrates higher levels of pain or higher level of disability.  Minimal Detectable Change = 13 scale points according to Lira et al 1991. SPADI is validated for patients 18 years and older, and if completed by a younger patient, is done to help determine functional deficits and activity limitations for goal use.      Goals:  Goal Identifier 1   Goal Description Patient will increase R shoulder flexion AROM to 170 degrees in order to improve ability to participate in functional activities   Target Date 19   Date Met      Progress: Unable to assess -  patient failed to schedule further appts.      Goal Identifier 2   Goal Description Patient will demonstrate R knee extension strength of 4/5 or greater in order to decrease pain with functional activities    Target Date 06/16/19   Date Met      Progress: Unable to assess - patient failed to schedule further appts.      Goal Identifier 3   Goal Description Patient will demonstrate ability to correctly perform HEP in order to prepare for long term success after discharge from PT.     Target Date 06/16/19   Date Met      Progress: Unable to assess - patient failed to schedule further appts.      Progress Toward Goals:   Not assessed this period. Unable to assess - patient failed to schedule further appts.     Plan:  Discharge from therapy.    Discharge:    Reason for Discharge: Patient has failed to schedule further appointments.    Equipment Issued: N/A    Discharge Plan: Patient to continue home program.

## 2019-07-19 ENCOUNTER — TELEPHONE (OUTPATIENT)
Dept: FAMILY MEDICINE | Facility: OTHER | Age: 49
End: 2019-07-19

## 2019-07-19 NOTE — TELEPHONE ENCOUNTER
Summary:    Patient is due/failing the following:   COLONOSCOPY and MAMMOGRAM    Action needed:   Schedule a mammogram and colonoscopy or complete a FIT test     Type of outreach:    Sent letter.    Questions for provider review:    None                                                                                                                                    Rosa Isela Romero     Chart routed to Care Team .        reynaldo Management Review      Patient has the following on her problem list: None      Composite cancer screening  Chart review shows that this patient is due/due soon for the following Mammogram and Colonoscopy

## 2019-07-19 NOTE — LETTER
[facility]  [id number] [place] [id number]  Phone: [phone]  July 19, 2019      [facility] [facility] [address]      Dear Kenneth,    We care about your health and have reviewed your health plan including your medical conditions, medications, and lab results.  Based on this review, it is recommended that you follow up regarding the following health topic(s):  -Breast Cancer Screening  -Colon Cancer Screening    We recommend you take the following action(s):  -schedule a MAMMOGRAM which is due. Please disregard this reminder if you have had this exam elsewhere within the last 1-2 years please let us know so we can update your records.  -schedule a COLONOSCOPY to look for colon cancer (due every 10 years or 5 years in higher risk situations.)  Colonoscopies can prevent 90-95% of colon cancer deaths.  Problem lesions can be removed before they ever become cancer.  If you do not wish to do a colonoscopy or cannot afford to do one at this time, there is another option called a Fecal Immunochemical Occult Blood Test (FIT) a take home stool sample kit.  It does not replace the colonoscopy for colorectal cancer screening, but it can detect hidden bleeding in the lower colon.  It does need to be repeated every year and if a positive result is obtained, you would be referred for a colonoscopy.  If you have completed either one of these tests at another facility, please have the records sent to our clinic for our records.     Please call us at the [facility] - [phone] (or use [employer]) to address the above recommendations.     Thank you for trusting [facility] and we appreciate the opportunity to serve you.  We look forward to supporting your healthcare needs in the future.    Healthy Regards,    Your Health Care Team  [facility]

## 2019-09-10 DIAGNOSIS — G47.00 INSOMNIA, UNSPECIFIED TYPE: ICD-10-CM

## 2019-09-11 NOTE — TELEPHONE ENCOUNTER
Pending Prescriptions:                       Disp   Refills    amitriptyline (ELAVIL) 25 MG tablet [Phar*30 tab*1            Sig: TAKE 1/2-1 TABLET BY MOUTH AT BEDTIME    Prescription approved per [facility].    Matilde Cuadra, RN, BSN

## 2019-09-17 ENCOUNTER — TELEPHONE (OUTPATIENT)
Dept: FAMILY MEDICINE | Facility: OTHER | Age: 49
End: 2019-09-17

## 2019-09-17 NOTE — TELEPHONE ENCOUNTER
Summary:    Patient is due/failing the following:   COLONOSCOPY and MAMMOGRAM    Action needed:   Schedule a mammogram and colonoscopy or complete a FIT test    Type of outreach:    Phone, spoke to patient.  patient will call back to set up.     Questions for provider review:    None                                                                                                                                    Rosa Isela Romero CMA       Chart routed to Care Team .        Panel Management Review      Patient has the following on her problem list: None      Composite cancer screening  Chart review shows that this patient is due/due soon for the following Mammogram and Colonoscopy

## 2019-12-10 ENCOUNTER — TELEPHONE (OUTPATIENT)
Dept: FAMILY MEDICINE | Facility: OTHER | Age: 49
End: 2019-12-10

## 2019-12-10 NOTE — TELEPHONE ENCOUNTER
Summary:    Patient is due/failing the following:   COLONOSCOPY and MAMMOGRAM    Action needed:   Schedule a mammogram and colonoscopy or complete a FIT test    Type of outreach:    Sent [employer] message.    Questions for provider review:    None                                                                                                                                    Rosa Isela Fonseca CMA       Chart routed to Care Team .          Panel Management Review      Patient has the following on her problem list: None      Composite cancer screening  Chart review shows that this patient is due/due soon for the following Mammogram and Colonoscopy

## 2020-02-28 NOTE — PROGRESS NOTES
Subjective     Kenneth Swift is a 49 year old female who presents to clinic today for the following health issues:    HPI   Right foot pain    Onset: December    Description:   Location: right foot  Character: Sharp and Stabbing    Intensity: moderate    Progression of Symptoms: worse    Accompanying Signs & Symptoms:  Other symptoms: none    History:   Previous similar pain: no       Precipitating factors:   Trauma or overuse: no     Alleviating factors:  Improved by: Ibuprofen    Therapies Tried and outcome: ibuprofen- helps a little     She has been using a big toe splint at night but is not sure that it helped much. Has been wearing athletic shoes or her winter boots which have a wider area for her toes.    Patient Active Problem List   Diagnosis     CARDIOVASCULAR SCREENING; LDL GOAL LESS THAN 160     Immunization not carried out because of patient refusal     Thyroid enlargement     Hashimoto's thyroiditis     Multinodular goiter     Vegetarianism     Bladder spasm     Urinary frequency     Sleep disturbance     History of vitamin D deficiency     History of non anemic vitamin B12 deficiency     Low back pain     Right leg paresthesias     Repetitive intrusions of sleep     Insomnia     Hypersomnia     Persistent disorder of initiating or maintaining sleep     Past Surgical History:   Procedure Laterality Date     RECONSTRUCT BREAST, IMPLANT PROSTHESIS, COMBINED       WISDOM [facility]         Social History     Tobacco Use     Smoking status: Former Smoker     Packs/day: 0.50     Years: 2.00     Pack years: 1.00     Last attempt to quit: 10/28/1989     Years since quittin.3     Smokeless tobacco: Never Used   Substance Use Topics     Alcohol use: Yes     Comment: red wine - few nights weekly     Family History   Problem Relation Age of Onset     Diabetes Mother         30's insulin dependent     Circulatory Mother         heart valve replacement     Thyroid Disease Sister         Hashimoto's      Other - See Comments Sister         Insomnia     Cerebrovascular Disease No family hx of      Hypertension No family hx of          Current Outpatient Medications   Medication Sig Dispense Refill     melatonin 3 MG tablet Take 1 tablet (3 mg) by mouth nightly as needed for sleep       Multiple Vitamin (MULTI-VITAMIN PO) Take  by mouth daily.       SYNTHROID 112 MCG tablet Take 1 tablet (112 mcg) by mouth daily By Dr. Brandon Fields endocrinologist       amitriptyline (ELAVIL) 25 MG tablet TAKE 1/2-1 TABLET BY MOUTH AT BEDTIME (Patient not taking: Reported on 3/3/2020) 30 tablet 1     No Known Allergies  BP Readings from Last 3 Encounters:   03/03/20 124/60   04/15/19 126/74   03/20/19 122/84    Wt Readings from Last 3 Encounters:   03/03/20 84.9 kg (187 lb 3.2 oz)   04/15/19 81.2 kg (179 lb)   03/20/19 78.2 kg (172 lb 6.4 oz)                    Reviewed and updated as needed this visit by Provider         Review of Systems   ROS COMP: Constitutional, HEENT, cardiovascular, pulmonary, gi and gu systems are negative, except as otherwise noted.      Objective    /60   Pulse 88   Temp 97.6  F (36.4  C) (Temporal)   Resp 16   Wt 84.9 kg (187 lb 3.2 oz)   BMI 28.90 kg/m    Body mass index is 28.9 kg/m .  Physical Exam   GENERAL: healthy, alert and no distress  MS: enlargement in area of 1st metatarsal phalangeal joint with mild tenderness to palpation  SKIN: no suspicious lesions or rashes  NEURO: Normal strength and tone, mentation intact and speech normal  PSYCH: mentation appears normal, affect normal/bright    Diagnostic Test Results:  Labs reviewed in Epic        Assessment & Plan     1. Bunion, right  It seems that she has a bunion that is causing the pain. She has been using a big toe splint at night but is not sure that it helped much. Has been wearing athletic shoes or her winter boots which have a wider area for her toes. I recommended she see the podiatrist for further evaluation and treatment.   -  PODIATRY/FOOT & ANKLE SURGERY REFERRAL    2. Right foot pain  - XR Foot Right G/E 3 Views; Future     No follow-ups on file.    JENNY Das [facility]

## 2020-03-03 ENCOUNTER — OFFICE VISIT (OUTPATIENT)
Dept: FAMILY MEDICINE | Facility: OTHER | Age: 50
End: 2020-03-03
Payer: COMMERCIAL

## 2020-03-03 ENCOUNTER — ANCILLARY PROCEDURE (OUTPATIENT)
Dept: GENERAL RADIOLOGY | Facility: OTHER | Age: 50
End: 2020-03-03
Attending: STUDENT IN AN ORGANIZED HEALTH CARE EDUCATION/TRAINING PROGRAM
Payer: COMMERCIAL

## 2020-03-03 VITALS
TEMPERATURE: 97.6 F | BODY MASS INDEX: 28.9 KG/M2 | RESPIRATION RATE: 16 BRPM | SYSTOLIC BLOOD PRESSURE: 124 MMHG | WEIGHT: 187.2 LBS | DIASTOLIC BLOOD PRESSURE: 60 MMHG | HEART RATE: 88 BPM

## 2020-03-03 DIAGNOSIS — M79.671 RIGHT FOOT PAIN: ICD-10-CM

## 2020-03-03 DIAGNOSIS — M21.611 BUNION, RIGHT: Primary | ICD-10-CM

## 2020-03-03 PROCEDURE — 73630 X-RAY EXAM OF FOOT: CPT | Mod: RT

## 2020-03-03 PROCEDURE — 99213 OFFICE O/P EST LOW 20 MIN: CPT | Performed by: STUDENT IN AN ORGANIZED HEALTH CARE EDUCATION/TRAINING PROGRAM

## 2020-03-03 ASSESSMENT — PAIN SCALES - GENERAL: PAINLEVEL: NO PAIN (0)

## 2020-03-22 ENCOUNTER — HEALTH MAINTENANCE LETTER (OUTPATIENT)
Age: 50
End: 2020-03-22

## 2020-09-18 NOTE — PROGRESS NOTES
SUBJECTIVE:   CC: Kenneth Swift is an 50 year old woman who presents for preventive health visit.       Patient has been advised of split billing requirements and indicates understanding: Yes  Healthy Habits:     Getting at least 3 servings of Calcium per day:  Yes    Bi-annual eye exam:  Yes    Dental care twice a year:  NO    Sleep apnea or symptoms of sleep apnea:  Daytime drowsiness    Diet:  Vegetarian/vegan    Frequency of exercise:  4-5 days/week    Duration of exercise:  30-45 minutes    Taking medications regularly:  Yes    Medication side effects:  None    PHQ-2 Total Score: 2    Additional concerns today:  No    She like to see a endocrinologist closer to home.  She previously been going to [place] I would prefer not to drive that far.  She has history of Hashimoto's disease and has felt like the levothyroxine is not as effective as it once was.  She ran out of levothyroxine 1 month ago and has not been taking it.  She reports she feels no difference when she takes it.    Today's PHQ-2 Score:   PHQ-2 (  [employer]) 2020   Q1: Little interest or pleasure in doing things 1   Q2: Feeling down, depressed or hopeless 1   PHQ-2 Score 2   Q1: Little interest or pleasure in doing things Several days   Q2: Feeling down, depressed or hopeless Several days   PHQ-2 Score 2       Abuse: Current or Past (Physical, Sexual or Emotional) - No  Do you feel safe in your environment? Yes        Social History     Tobacco Use     Smoking status: Former Smoker     Packs/day: 0.50     Years: 2.00     Pack years: 1.00     Last attempt to quit: 10/28/1989     Years since quittin.9     Smokeless tobacco: Never Used   Substance Use Topics     Alcohol use: Yes     Comment: red wine - few nights weekly     If you drink alcohol do you typically have >3 drinks per day or >7 drinks per week? No    Alcohol Use 2020   Prescreen: >3 drinks/day or >7 drinks/week? No   Prescreen: >3 drinks/day or >7 drinks/week? -        Reviewed orders with patient.  Reviewed health maintenance and updated orders accordingly - Yes  Lab work is in process  Labs reviewed in EPIC  BP Readings from Last 3 Encounters:   20 124/60   04/15/19 126/74   19 122/84    Wt Readings from Last 3 Encounters:   20 84.9 kg (187 lb 3.2 oz)   04/15/19 81.2 kg (179 lb)   19 78.2 kg (172 lb 6.4 oz)                  Patient Active Problem List   Diagnosis     CARDIOVASCULAR SCREENING; LDL GOAL LESS THAN 160     Immunization not carried out because of patient refusal     Thyroid enlargement     Hashimoto's thyroiditis     Multinodular goiter     Vegetarianism     Bladder spasm     Urinary frequency     Sleep disturbance     History of vitamin D deficiency     History of non anemic vitamin B12 deficiency     Low back pain     Right leg paresthesias     Repetitive intrusions of sleep     Insomnia     Hypersomnia     Persistent disorder of initiating or maintaining sleep     Past Surgical History:   Procedure Laterality Date     RECONSTRUCT BREAST, IMPLANT PROSTHESIS, COMBINED       W[facility]DOM [facility]         Social History     Tobacco Use     Smoking status: Former Smoker     Packs/day: 0.50     Years: 2.00     Pack years: 1.00     Last attempt to quit: 10/28/1989     Years since quittin.9     Smokeless tobacco: Never Used   Substance Use Topics     Alcohol use: Yes     Comment: red wine - few nights weekly     Family History   Problem Relation Age of Onset     Diabetes Mother         30's insulin dependent     Circulatory Mother         heart valve replacement     Thyroid Disease Sister         Hashimoto's     Other - See Comments Sister         Insomnia     Cerebrovascular Disease No family hx of      Hypertension No family hx of          Current Outpatient Medications   Medication Sig Dispense Refill     melatonin 3 MG tablet Take 1 tablet (3 mg) by mouth nightly as needed for sleep       Multiple Vitamin (MULTI-VITAMIN PO) Take  by  mouth daily.       SYNTHROID 112 MCG tablet Take 1 tablet (112 mcg) by mouth daily By Dr. Brandon Fields endocrinologist       No Known Allergies  Recent Labs   Lab Test 04/08/19  1016 11/21/14  1058 10/25/13  1056   *  --   --    HDL 92  --   --    TRIG 70  --   --    CR 0.72  --   --    GFRESTIMATED >90  --   --    GFRESTBLACK >90  --   --    POTASSIUM 3.8  --   --    TSH  --  1.04 0.47        Mammogram Screening: Patient over age 50, mutual decision to screen reflected in health maintenance.    Pertinent mammograms are reviewed under the imaging tab.  History of abnormal Pap smear: NO - age 30-65 PAP every 5 years with negative HPV co-testing recommended  Status post benign hysterectomy. Health Maintenance and Surgical History updated.  PAP / HPV Latest Ref Rng & Units 3/20/2019 12/17/2014 6/21/2011   PAP - NIL NIL NIL   HPV 16 DNA NEG:Negative Negative - -   HPV 18 DNA NEG:Negative Negative - -   OTHER HR HPV NEG:Negative Negative - -     Reviewed and updated as needed this visit by clinical staff  Allergies  Meds         Reviewed and updated as needed this visit by Provider        Past Medical History:   Diagnosis Date     Hashimoto's disease      Hypersomnia, unspecified 2015    sleep study     Immunization not carried out because of patient refusal 6/21/2011    tdap     Insomnia, unspecified 2015    sleep study     Persistent disorder of initiating or maintaining sleep 2015    sleep study     Repetitive intrusions of sleep 2015    sleep study     Shingles 2008    hand - resolved without complications     Vitamin B12 deficiency 2012     Vitamin D deficiency       Past Surgical History:   Procedure Laterality Date     RECONSTRUCT BREAST, IMPLANT PROSTHESIS, COMBINED       WISDOM ST GUIDEWIRE         Review of Systems   Constitutional: Negative for chills and fever.   HENT: Negative for congestion, ear pain, hearing loss and sore throat.    Eyes: Negative for pain and visual disturbance.   Respiratory:  Negative for cough and shortness of breath.    Cardiovascular: Negative for chest pain, palpitations and peripheral edema.   Gastrointestinal: Negative for abdominal pain, constipation, diarrhea, heartburn, hematochezia and nausea.   Breasts:  Negative for tenderness, breast mass and discharge.   Genitourinary: Negative for dysuria, frequency, genital sores, hematuria, pelvic pain, urgency, vaginal bleeding and vaginal discharge.   Musculoskeletal: Negative for arthralgias, joint swelling and myalgias.   Skin: Negative for rash.   Neurological: Negative for dizziness, weakness, headaches and paresthesias.   Psychiatric/Behavioral: Negative for mood changes. The patient is not nervous/anxious.        OBJECTIVE:   There were no vitals taken for this visit.  Physical Exam  GENERAL: healthy, alert and no distress  EYES: Eyes grossly normal to inspection, PERRL and conjunctivae and sclerae normal  HENT: ear canals and TM's normal, nose and mouth without ulcers or lesions  NECK: no adenopathy, no asymmetry, masses, or scars and thyroid normal to palpation  RESP: lungs clear to auscultation - no rales, rhonchi or wheezes  BREAST: normal without masses, tenderness or nipple discharge and no palpable axillary masses or adenopathy  CV: regular rate and rhythm, normal S1 S2, no S3 or S4, no murmur, click or rub, no peripheral edema and peripheral pulses strong  ABDOMEN: soft, nontender, no hepatosplenomegaly, no masses and bowel sounds normal  MS: no gross musculoskeletal defects noted, no edema  SKIN: no suspicious lesions or rashes  NEURO: Normal strength and tone, mentation intact and speech normal  PSYCH: mentation appears normal, affect normal/bright    Diagnostic Test Results:  Labs reviewed in Epic    ASSESSMENT/PLAN:   1. Routine general medical examination at a health care facility  See notes    2. Hashimoto's thyroiditis  Wanting to establish care with endocrinologist closer to home.  She has felt like levothyroxine  "is not as effective as it once was when she started it.  She has been off of levothyroxine for the past month and reports feeling no different than when she is on it.  She is interested in adding a T3 medication which she will discuss with the endocrinologist.  - ENDOCRINOLOGY ADULT REFERRAL  - TSH  - T4, free  - T3, total    3. Thyroid enlargement  - ENDOCRINOLOGY ADULT REFERRAL    4. Special screening for malignant neoplasms, colon  - Fecal colorectal cancer screen (FIT); Future    5. Vitamin D deficiency  - Vitamin D Deficiency    6. Vitamin B12 deficiency (non anemic)  - Vitamin B12    7. Iron deficiency anemia, unspecified iron deficiency anemia type  - Ferritin  - Iron and iron binding capacity  - CBC with platelets    8. Screening for diabetes mellitus  - Basic metabolic panel  (Ca, Cl, CO2, Creat, Gluc, K, Na, BUN)    Patient has been advised of split billing requirements and indicates understanding: Yes  COUNSELING:  Reviewed preventive health counseling, as reflected in patient instructions       Regular exercise       Healthy diet/nutrition       Immunizations    Declined: Influenza, Td and Zoster due to Concerns about side effects/safety               Colon cancer screening    Estimated body mass index is 28.9 kg/m  as calculated from the following:    Height as of 4/15/19: 1.714 m (5' 7.48\").    Weight as of 3/3/20: 84.9 kg (187 lb 3.2 oz).    Weight management plan: Discussed healthy diet and exercise guidelines    She reports that she quit smoking about 30 years ago. She has a 1.00 pack-year smoking history. She has never used smokeless tobacco.      Counseling Resources:  ATP IV Guidelines  Pooled Cohorts Equation Calculator  Breast Cancer Risk Calculator  BRCA-Related Cancer Risk Assessment: FHS-7 Tool  FRAX Risk Assessment  ICSI Preventive Guidelines  Dietary Guidelines for Americans, 2010  USDA's MyPlate  ASA Prophylaxis  Lung CA Screening    Allison Crisostomo, JENNY [facility] " [place]

## 2020-09-22 ENCOUNTER — OFFICE VISIT (OUTPATIENT)
Dept: FAMILY MEDICINE | Facility: OTHER | Age: 50
End: 2020-09-22
Payer: COMMERCIAL

## 2020-09-22 DIAGNOSIS — Z00.00 ROUTINE GENERAL MEDICAL EXAMINATION AT A HEALTH CARE FACILITY: Primary | ICD-10-CM

## 2020-09-22 DIAGNOSIS — D50.9 IRON DEFICIENCY ANEMIA, UNSPECIFIED IRON DEFICIENCY ANEMIA TYPE: ICD-10-CM

## 2020-09-22 DIAGNOSIS — E53.8 VITAMIN B12 DEFICIENCY (NON ANEMIC): ICD-10-CM

## 2020-09-22 DIAGNOSIS — Z12.11 SPECIAL SCREENING FOR MALIGNANT NEOPLASMS, COLON: ICD-10-CM

## 2020-09-22 DIAGNOSIS — Z13.1 SCREENING FOR DIABETES MELLITUS: ICD-10-CM

## 2020-09-22 DIAGNOSIS — E06.3 HASHIMOTO'S THYROIDITIS: ICD-10-CM

## 2020-09-22 DIAGNOSIS — E04.9 THYROID ENLARGEMENT: ICD-10-CM

## 2020-09-22 DIAGNOSIS — E55.9 VITAMIN D DEFICIENCY: ICD-10-CM

## 2020-09-22 LAB
ANION GAP SERPL CALCULATED.3IONS-SCNC: 6 MMOL/L (ref 3–14)
BUN SERPL-MCNC: 13 MG/DL (ref 7–30)
CALCIUM SERPL-MCNC: 8.8 MG/DL (ref 8.5–10.1)
CHLORIDE SERPL-SCNC: 106 MMOL/L (ref 94–109)
CO2 SERPL-SCNC: 27 MMOL/L (ref 20–32)
CREAT SERPL-MCNC: 0.76 MG/DL (ref 0.52–1.04)
ERYTHROCYTE [DISTWIDTH] IN BLOOD BY AUTOMATED COUNT: 12.9 % (ref 10–15)
FERRITIN SERPL-MCNC: 9 NG/ML (ref 8–252)
GFR SERPL CREATININE-BSD FRML MDRD: >90 ML/MIN/{1.73_M2}
GLUCOSE SERPL-MCNC: 95 MG/DL (ref 70–99)
HCT VFR BLD AUTO: 36.2 % (ref 35–47)
HGB BLD-MCNC: 12 G/DL (ref 11.7–15.7)
IRON SATN MFR SERPL: 25 % (ref 15–46)
IRON SERPL-MCNC: 89 UG/DL (ref 35–180)
MCH RBC QN AUTO: 32.3 PG (ref 26.5–33)
MCHC RBC AUTO-ENTMCNC: 33.1 G/DL (ref 31.5–36.5)
MCV RBC AUTO: 98 FL (ref 78–100)
PLATELET # BLD AUTO: 283 10E9/L (ref 150–450)
POTASSIUM SERPL-SCNC: 3.9 MMOL/L (ref 3.4–5.3)
RBC # BLD AUTO: 3.71 10E12/L (ref 3.8–5.2)
SODIUM SERPL-SCNC: 139 MMOL/L (ref 133–144)
T3 SERPL-MCNC: 120 NG/DL (ref 60–181)
T4 FREE SERPL-MCNC: 1.1 NG/DL (ref 0.76–1.46)
TIBC SERPL-MCNC: 354 UG/DL (ref 240–430)
TSH SERPL DL<=0.005 MIU/L-ACNC: 1.31 MU/L (ref 0.4–4)
VIT B12 SERPL-MCNC: 339 PG/ML (ref 193–986)
WBC # BLD AUTO: 6 10E9/L (ref 4–11)

## 2020-09-22 PROCEDURE — 82728 ASSAY OF FERRITIN: CPT | Performed by: STUDENT IN AN ORGANIZED HEALTH CARE EDUCATION/TRAINING PROGRAM

## 2020-09-22 PROCEDURE — 80048 BASIC METABOLIC PNL TOTAL CA: CPT | Performed by: STUDENT IN AN ORGANIZED HEALTH CARE EDUCATION/TRAINING PROGRAM

## 2020-09-22 PROCEDURE — 36415 COLL VENOUS BLD VENIPUNCTURE: CPT | Performed by: STUDENT IN AN ORGANIZED HEALTH CARE EDUCATION/TRAINING PROGRAM

## 2020-09-22 PROCEDURE — 84480 ASSAY TRIIODOTHYRONINE (T3): CPT | Performed by: STUDENT IN AN ORGANIZED HEALTH CARE EDUCATION/TRAINING PROGRAM

## 2020-09-22 PROCEDURE — 84439 ASSAY OF FREE THYROXINE: CPT | Performed by: STUDENT IN AN ORGANIZED HEALTH CARE EDUCATION/TRAINING PROGRAM

## 2020-09-22 PROCEDURE — 84443 ASSAY THYROID STIM HORMONE: CPT | Performed by: STUDENT IN AN ORGANIZED HEALTH CARE EDUCATION/TRAINING PROGRAM

## 2020-09-22 PROCEDURE — 82306 VITAMIN D 25 HYDROXY: CPT | Performed by: STUDENT IN AN ORGANIZED HEALTH CARE EDUCATION/TRAINING PROGRAM

## 2020-09-22 PROCEDURE — 83540 ASSAY OF IRON: CPT | Performed by: STUDENT IN AN ORGANIZED HEALTH CARE EDUCATION/TRAINING PROGRAM

## 2020-09-22 PROCEDURE — 99396 PREV VISIT EST AGE 40-64: CPT | Performed by: STUDENT IN AN ORGANIZED HEALTH CARE EDUCATION/TRAINING PROGRAM

## 2020-09-22 PROCEDURE — 82607 VITAMIN B-12: CPT | Performed by: STUDENT IN AN ORGANIZED HEALTH CARE EDUCATION/TRAINING PROGRAM

## 2020-09-22 PROCEDURE — 83550 IRON BINDING TEST: CPT | Performed by: STUDENT IN AN ORGANIZED HEALTH CARE EDUCATION/TRAINING PROGRAM

## 2020-09-22 PROCEDURE — 85027 COMPLETE CBC AUTOMATED: CPT | Performed by: STUDENT IN AN ORGANIZED HEALTH CARE EDUCATION/TRAINING PROGRAM

## 2020-09-22 ASSESSMENT — ENCOUNTER SYMPTOMS
PALPITATIONS: 0
DIZZINESS: 0
EYE PAIN: 0
FREQUENCY: 0
WEAKNESS: 0
MYALGIAS: 0
SORE THROAT: 0
ARTHRALGIAS: 0
FEVER: 0
COUGH: 0
PARESTHESIAS: 0
ABDOMINAL PAIN: 0
DYSURIA: 0
CHILLS: 0
JOINT SWELLING: 0
NAUSEA: 0
HEMATURIA: 0
HEADACHES: 0
DIARRHEA: 0
HEARTBURN: 0
BREAST MASS: 0
HEMATOCHEZIA: 0
NERVOUS/ANXIOUS: 0
SHORTNESS OF BREATH: 0
CONSTIPATION: 0

## 2020-09-23 LAB — DEPRECATED CALCIDIOL+CALCIFEROL SERPL-MC: 27 UG/L (ref 20–75)

## 2020-10-05 ENCOUNTER — VIRTUAL VISIT (OUTPATIENT)
Dept: FAMILY MEDICINE | Facility: OTHER | Age: 50
End: 2020-10-05
Payer: COMMERCIAL

## 2020-10-05 PROCEDURE — 99421 OL DIG E/M SVC 5-10 MIN: CPT | Performed by: PHYSICIAN ASSISTANT

## 2020-10-06 ENCOUNTER — AMBULATORY - HEALTHEAST (OUTPATIENT)
Dept: FAMILY MEDICINE | Facility: CLINIC | Age: 50
End: 2020-10-06

## 2020-10-06 DIAGNOSIS — Z20.822 SUSPECTED COVID-19 VIRUS INFECTION: ICD-10-CM

## 2020-10-30 ENCOUNTER — VIRTUAL VISIT (OUTPATIENT)
Dept: ENDOCRINOLOGY | Facility: CLINIC | Age: 50
End: 2020-10-30
Attending: STUDENT IN AN ORGANIZED HEALTH CARE EDUCATION/TRAINING PROGRAM
Payer: COMMERCIAL

## 2020-10-30 DIAGNOSIS — L65.9 HAIR LOSS: ICD-10-CM

## 2020-10-30 DIAGNOSIS — E06.3 HYPOTHYROIDISM DUE TO HASHIMOTO'S THYROIDITIS: Primary | ICD-10-CM

## 2020-10-30 DIAGNOSIS — R53.83 LOW ENERGY: ICD-10-CM

## 2020-10-30 DIAGNOSIS — Z83.49 FAMILY HISTORY OF THYROID DISEASE: ICD-10-CM

## 2020-10-30 PROCEDURE — 99244 OFF/OP CNSLTJ NEW/EST MOD 40: CPT | Mod: 95 | Performed by: INTERNAL MEDICINE

## 2020-10-30 NOTE — PROGRESS NOTES
"Kenneth Swift is a 50 year old female who is being evaluated via a billable video visit.      The patient has been notified of following:     \"This video visit will be conducted via a call between you and your physician/provider. We have found that certain health care needs can be provided without the need for an in-person physical exam.  This service lets us provide the care you need with a video conversation.  If a prescription is necessary we can send it directly to your pharmacy.  If lab work is needed we can place an order for that and you can then stop by our lab to have the test done at a later time.    Video visits are billed at different rates depending on your insurance coverage.  Please reach out to your insurance provider with any questions.    If during the course of the call the physician/provider feels a video visit is not appropriate, you will not be charged for this service.\"    Patient has given verbal consent for Video visit? Yes  How would you like to obtain your AVS? MyChart  If you are dropped from the video visit, the video invite should be resent to: Text to cell phone: [phone]   Will anyone else be joining your video visit? No        Video-Visit Details    Type of service:  Video Visit    Video Start Time: 2:15 pm  End 3:00 pm    Originating Location (pt. Location): Home    Distant Location (provider location):  [facility]     Platform used for Video Visit: [facility]                                                                               - Endocrinology Initial Consultation -    Reason for visit/consult:  Hypothyroidism due to Hashimoto's thyroiditis    Primary care provider: Allison Crisostomo    HPI: A 50-year-old female here for evaluation of her thyroid condition.  She was diagnosed hypothyroidism approximately 10 years ago and has been taking levothyroxine since then.  She has been seen by endocrinologist in a diner and has been taking " levothyroxine 112 mcg daily however due to distance from home she wanted to transit care to a local.  She mentioned to 2-1/2 months she has been running out of medication.  She mentioned low energy level especially end of the day hair and eyebrows are getting thinner however which has not changed with his overall result levothyroxine.  She was checked thyroid function on September 22, 2020 little bit more than 1 months after stopped thyroid medication which was TSH 1.3 free T4 1.1 euthyroid.  She was wondering whether she needed thyroid medication or not.   Her menstrual cycle has been regular.  No other significant past medical history and no surgical histories.  Patient has family history of thyroid condition her elderly sister and younger sister both had thyroid conditions.     Past Medical/Surgical History:  Past Medical History:   Diagnosis Date     Hashimoto's disease      Hypersomnia, unspecified 2015    sleep study     Immunization not carried out because of patient refusal 6/21/2011    tdap     Insomnia, unspecified 2015    sleep study     Persistent disorder of initiating or maintaining sleep 2015    sleep study     Repetitive intrusions of sleep 2015    sleep study     Shingles 2008    hand - resolved without complications     Vitamin B12 deficiency 2012     Vitamin D deficiency      Past Surgical History:   Procedure Laterality Date     RECONSTRUCT BREAST, IMPLANT PROSTHESIS, COMBINED       WISDOM ST GUIDEWIRE         Allergies:  No Known Allergies    Current Medications   Current Outpatient Medications   Medication     melatonin 3 MG tablet     Multiple Vitamin (MULTI-VITAMIN PO)     SYNTHROID 112 MCG tablet     No current facility-administered medications for this visit.        Family History:  Family History   Problem Relation Age of Onset     Diabetes Mother         30's insulin dependent     Circulatory Mother         heart valve replacement     Thyroid Disease Sister         Hashimoto's     Other -  See Comments Sister         Insomnia     Cerebrovascular Disease No family hx of      Hypertension No family hx of        Social History:  Social History     Tobacco Use     Smoking status: Former Smoker     Packs/day: 0.50     Years: 2.00     Pack years: 1.00     Quit date: 10/28/1989     Years since quittin.0     Smokeless tobacco: Never Used   Substance Use Topics     Alcohol use: Yes     Comment: red wine - few nights weekly   artist: lives with . She has two adult children age 24, 25.     ROS:  Full review of systems taken with the help of the intake sheet. Otherwise a complete 14 point review of systems was taken and is negative unless stated in the history above.      Physical Exam:   Vitals: There were no vitals taken for this visit.  BMI= There is no height or weight on file to calculate BMI.   General: well appearing, no acute distress, pleasant and conversant,   Mental Status/neuro: alert and oriented  Face: symmetrical, normal facial color  Eyes: anicteric, PERRL, no proptosis or lid lag  Resp: no acute distress      Labs : I reviewed data from epic and extract and summarize the pertinent data here.   Lab Results   Component Value Date     2020      Lab Results   Component Value Date    POTASSIUM 3.9 2020     Lab Results   Component Value Date    CHLORIDE 106 2020     Lab Results   Component Value Date    JAJA 8.8 2020     Lab Results   Component Value Date    CO2 27 2020     Lab Results   Component Value Date    BUN 13 2020     Lab Results   Component Value Date    CR 0.76 2020     Lab Results   Component Value Date    GLC 95 2020     Lab Results   Component Value Date    TSH 1.31 2020     Lab Results   Component Value Date    T4 1.10 2020           Assessment and Plan  50 year old female with     Most recent lab more than 1 month off meds, looks euthryoid,     - recheck TSH, free T4, total T3  - also check TPO    - we will  evaluate the data then we will decide whether she needs LT4 or not, or if needs, will determine the dosage (maybe smaller dose).    RTC with me in 6 month    Gracia Oviedo MD  Staff Physician  Endocrinology and Metabolism  License: [id number]

## 2020-10-30 NOTE — LETTER
"    10/30/2020         RE: Kenneth Swift  [address]  [facility] [address]        Dear Colleague,    Thank you for referring your patient, Kenneth Swift, to the [facility]. Please see a copy of my visit note below.    Kenneth Swift is a 50 year old female who is being evaluated via a billable video visit.      The patient has been notified of following:     \"This video visit will be conducted via a call between you and your physician/provider. We have found that certain health care needs can be provided without the need for an in-person physical exam.  This service lets us provide the care you need with a video conversation.  If a prescription is necessary we can send it directly to your pharmacy.  If lab work is needed we can place an order for that and you can then stop by our lab to have the test done at a later time.    Video visits are billed at different rates depending on your insurance coverage.  Please reach out to your insurance provider with any questions.    If during the course of the call the physician/provider feels a video visit is not appropriate, you will not be charged for this service.\"    Patient has given verbal consent for Video visit? Yes  How would you like to obtain your AVS? MyChart  If you are dropped from the video visit, the video invite should be resent to: Text to cell phone: [phone]   Will anyone else be joining your video visit? No        Video-Visit Details    Type of service:  Video Visit    Video Start Time: 2:15 pm  End 3:00 pm    Originating Location (pt. Location): Home    Distant Location (provider location):  [facility]     Platform used for Video Visit: [employer]                                                                               - Endocrinology Initial Consultation -    Reason for visit/consult:  Hypothyroidism due to Hashimoto's thyroiditis    Primary care provider: Allison Crisostomo " Racquel FERNANDES: A 50-year-old female here for evaluation of her thyroid condition.  She was diagnosed hypothyroidism approximately 10 years ago and has been taking levothyroxine since then.  She has been seen by endocrinologist in a diner and has been taking levothyroxine 112 mcg daily however due to distance from home she wanted to transit care to a local.  She mentioned to 2-1/2 months she has been running out of medication.  She mentioned low energy level especially end of the day hair and eyebrows are getting thinner however which has not changed with his overall result levothyroxine.  She was checked thyroid function on September 22, 2020 little bit more than 1 months after stopped thyroid medication which was TSH 1.3 free T4 1.1 euthyroid.  She was wondering whether she needed thyroid medication or not.   Her menstrual cycle has been regular.  No other significant past medical history and no surgical histories.  Patient has family history of thyroid condition her elderly sister and younger sister both had thyroid conditions.     Past Medical/Surgical History:  Past Medical History:   Diagnosis Date     Hashimoto's disease      Hypersomnia, unspecified 2015    sleep study     Immunization not carried out because of patient refusal 6/21/2011    tdap     Insomnia, unspecified 2015    sleep study     Persistent disorder of initiating or maintaining sleep 2015    sleep study     Repetitive intrusions of sleep 2015    sleep study     Shingles 2008    hand - resolved without complications     Vitamin B12 deficiency 2012     Vitamin D deficiency      Past Surgical History:   Procedure Laterality Date     RECONSTRUCT BREAST, IMPLANT PROSTHESIS, COMBINED       WISDOM ST GUIDEWIRE         Allergies:  No Known Allergies    Current Medications   Current Outpatient Medications   Medication     melatonin 3 MG tablet     Multiple Vitamin (MULTI-VITAMIN PO)     SYNTHROID 112 MCG tablet     No current facility-administered  medications for this visit.        Family History:  Family History   Problem Relation Age of Onset     Diabetes Mother         30's insulin dependent     Circulatory Mother         heart valve replacement     Thyroid Disease Sister         Hashimoto's     Other - See Comments Sister         Insomnia     Cerebrovascular Disease No family hx of      Hypertension No family hx of        Social History:  Social History     Tobacco Use     Smoking status: Former Smoker     Packs/day: 0.50     Years: 2.00     Pack years: 1.00     Quit date: 10/28/1989     Years since quittin.0     Smokeless tobacco: Never Used   Substance Use Topics     Alcohol use: Yes     Comment: red wine - few nights weekly   artist: lives with . She has two adult children age 24, 25.     ROS:  Full review of systems taken with the help of the intake sheet. Otherwise a complete 14 point review of systems was taken and is negative unless stated in the history above.      Physical Exam:   Vitals: There were no vitals taken for this visit.  BMI= There is no height or weight on file to calculate BMI.   General: well appearing, no acute distress, pleasant and conversant,   Mental Status/neuro: alert and oriented  Face: symmetrical, normal facial color  Eyes: anicteric, PERRL, no proptosis or lid lag  Resp: no acute distress      Labs : I reviewed data from epic and extract and summarize the pertinent data here.   Lab Results   Component Value Date     2020      Lab Results   Component Value Date    POTASSIUM 3.9 2020     Lab Results   Component Value Date    CHLORIDE 106 2020     Lab Results   Component Value Date    JAJA 8.8 2020     Lab Results   Component Value Date    CO2 27 2020     Lab Results   Component Value Date    BUN 13 2020     Lab Results   Component Value Date    CR 0.76 2020     Lab Results   Component Value Date    GLC 95 2020     Lab Results   Component Value Date    TSH 1.31  09/22/2020     Lab Results   Component Value Date    T4 1.10 09/22/2020           Assessment and Plan  50 year old female with     Most recent lab more than 1 month off meds, looks euthryoid,     - recheck TSH, free T4, total T3  - also check TPO    - we will evaluate the data then we will decide whether she needs LT4 or not, or if needs, will determine the dosage (maybe smaller dose).    RTC with me in 6 month    Gracia Oviedo MD  Staff Physician  Endocrinology and Metabolism  License: [id number]          Again, thank you for allowing me to participate in the care of your patient.        Sincerely,        Gracia Oviedo MD

## 2020-11-27 DIAGNOSIS — E06.3 HYPOTHYROIDISM DUE TO HASHIMOTO'S THYROIDITIS: ICD-10-CM

## 2020-11-27 LAB
T3 SERPL-MCNC: 81 NG/DL (ref 60–181)
T4 FREE SERPL-MCNC: 0.8 NG/DL (ref 0.76–1.46)
TSH SERPL DL<=0.005 MIU/L-ACNC: 0.94 MU/L (ref 0.4–4)

## 2020-11-27 PROCEDURE — 84480 ASSAY TRIIODOTHYRONINE (T3): CPT | Performed by: INTERNAL MEDICINE

## 2020-11-27 PROCEDURE — 84439 ASSAY OF FREE THYROXINE: CPT | Performed by: INTERNAL MEDICINE

## 2020-11-27 PROCEDURE — 84443 ASSAY THYROID STIM HORMONE: CPT | Performed by: INTERNAL MEDICINE

## 2020-11-27 PROCEDURE — 86376 MICROSOMAL ANTIBODY EACH: CPT | Performed by: INTERNAL MEDICINE

## 2020-11-27 PROCEDURE — 36415 COLL VENOUS BLD VENIPUNCTURE: CPT | Performed by: INTERNAL MEDICINE

## 2020-11-30 LAB — THYROPEROXIDASE AB SERPL-ACNC: 20 IU/ML

## 2020-12-16 ENCOUNTER — MYC MEDICAL ADVICE (OUTPATIENT)
Dept: ENDOCRINOLOGY | Facility: CLINIC | Age: 50
End: 2020-12-16

## 2020-12-16 DIAGNOSIS — E06.3 HASHIMOTO'S THYROIDITIS: ICD-10-CM

## 2020-12-16 NOTE — RESULT ENCOUNTER NOTE
Thyroid still normal range, but would like to know how are you doing?   If you are tired, not feeling well, low energy, may be try small dose again would be an option. What do you think?

## 2020-12-17 ENCOUNTER — TELEPHONE (OUTPATIENT)
Dept: ENDOCRINOLOGY | Facility: CLINIC | Age: 50
End: 2020-12-17

## 2020-12-17 NOTE — TELEPHONE ENCOUNTER
----- Message from Gracia Oviedo MD sent at 12/16/2020  7:18 AM CST -----  Thyroid still normal range, but would like to know how are you doing?   If you are tired, not feeling well, low energy, may be try small dose again would be an option. What do you think?

## 2020-12-17 NOTE — TELEPHONE ENCOUNTER
Please refer to 12/16/2020 My Chart Encounter for additional details.    Latha Stafford LPN  Diabetes Clinic Coordinator   Adult Endocrinology and Pediatric [facility] Clinics  [facility]

## 2020-12-22 RX ORDER — LEVOTHYROXINE SODIUM 112 MCG
112 TABLET ORAL DAILY
Qty: 90 TABLET | Refills: 1 | Status: SHIPPED | OUTPATIENT
Start: 2020-12-22 | End: 2022-07-21

## 2021-01-15 ENCOUNTER — HEALTH MAINTENANCE LETTER (OUTPATIENT)
Age: 51
End: 2021-01-15

## 2021-05-09 ENCOUNTER — HEALTH MAINTENANCE LETTER (OUTPATIENT)
Age: 51
End: 2021-05-09

## 2021-08-03 ENCOUNTER — LAB (OUTPATIENT)
Dept: LAB | Facility: CLINIC | Age: 51
End: 2021-08-03
Payer: COMMERCIAL

## 2021-08-03 DIAGNOSIS — Z12.11 SPECIAL SCREENING FOR MALIGNANT NEOPLASMS, COLON: ICD-10-CM

## 2021-08-03 PROCEDURE — 82274 ASSAY TEST FOR BLOOD FECAL: CPT

## 2021-08-05 LAB — HEMOCCULT STL QL IA: NEGATIVE

## 2021-09-22 ASSESSMENT — ENCOUNTER SYMPTOMS
DIZZINESS: 0
FREQUENCY: 1
NERVOUS/ANXIOUS: 1
SHORTNESS OF BREATH: 0
HEMATURIA: 0
HEMATOCHEZIA: 1
JOINT SWELLING: 0
FEVER: 0
NAUSEA: 0
PARESTHESIAS: 0
BREAST MASS: 0
CONSTIPATION: 1
HEARTBURN: 1
DIARRHEA: 0
ABDOMINAL PAIN: 0
WEAKNESS: 0
COUGH: 0
PALPITATIONS: 0
ARTHRALGIAS: 0
SORE THROAT: 0
CHILLS: 0
HEADACHES: 0
DYSURIA: 0
MYALGIAS: 0
EYE PAIN: 0

## 2021-09-27 ENCOUNTER — OFFICE VISIT (OUTPATIENT)
Dept: FAMILY MEDICINE | Facility: CLINIC | Age: 51
End: 2021-09-27
Payer: COMMERCIAL

## 2021-09-27 VITALS
WEIGHT: 180.1 LBS | BODY MASS INDEX: 27.29 KG/M2 | HEIGHT: 68 IN | SYSTOLIC BLOOD PRESSURE: 126 MMHG | HEART RATE: 105 BPM | DIASTOLIC BLOOD PRESSURE: 78 MMHG | OXYGEN SATURATION: 100 % | TEMPERATURE: 97.8 F

## 2021-09-27 DIAGNOSIS — D50.0 IRON DEFICIENCY ANEMIA DUE TO CHRONIC BLOOD LOSS: ICD-10-CM

## 2021-09-27 DIAGNOSIS — E06.3 HASHIMOTO'S THYROIDITIS: ICD-10-CM

## 2021-09-27 DIAGNOSIS — F10.10 ALCOHOL ABUSE: ICD-10-CM

## 2021-09-27 DIAGNOSIS — Z86.39 HISTORY OF VITAMIN D DEFICIENCY: ICD-10-CM

## 2021-09-27 DIAGNOSIS — Z98.82 SILICONE BREAST IMPLANT IN SITU: ICD-10-CM

## 2021-09-27 DIAGNOSIS — Z00.00 ROUTINE GENERAL MEDICAL EXAMINATION AT A HEALTH CARE FACILITY: Primary | ICD-10-CM

## 2021-09-27 DIAGNOSIS — Z12.11 SPECIAL SCREENING FOR MALIGNANT NEOPLASMS, COLON: ICD-10-CM

## 2021-09-27 DIAGNOSIS — N63.20 LUMP OF BREAST, LEFT: ICD-10-CM

## 2021-09-27 DIAGNOSIS — Z13.1 SCREENING FOR DIABETES MELLITUS: ICD-10-CM

## 2021-09-27 DIAGNOSIS — G47.00 PERSISTENT DISORDER OF INITIATING OR MAINTAINING SLEEP: ICD-10-CM

## 2021-09-27 DIAGNOSIS — K64.4 EXTERNAL HEMORRHOIDS: ICD-10-CM

## 2021-09-27 DIAGNOSIS — Z78.9 VEGETARIANISM: ICD-10-CM

## 2021-09-27 PROBLEM — K90.89 OTHER SPECIFIED INTESTINAL MALABSORPTION: Status: ACTIVE | Noted: 2021-09-27

## 2021-09-27 LAB
ALBUMIN SERPL-MCNC: 3.6 G/DL (ref 3.4–5)
ALP SERPL-CCNC: 64 U/L (ref 40–150)
ALT SERPL W P-5'-P-CCNC: 16 U/L (ref 0–50)
ANION GAP SERPL CALCULATED.3IONS-SCNC: 4 MMOL/L (ref 3–14)
AST SERPL W P-5'-P-CCNC: 13 U/L (ref 0–45)
BILIRUB SERPL-MCNC: 0.3 MG/DL (ref 0.2–1.3)
BUN SERPL-MCNC: 10 MG/DL (ref 7–30)
CALCIUM SERPL-MCNC: 8.3 MG/DL (ref 8.5–10.1)
CHLORIDE BLD-SCNC: 106 MMOL/L (ref 94–109)
CO2 SERPL-SCNC: 28 MMOL/L (ref 20–32)
CREAT SERPL-MCNC: 0.87 MG/DL (ref 0.52–1.04)
DEPRECATED CALCIDIOL+CALCIFEROL SERPL-MC: 38 UG/L (ref 20–75)
ERYTHROCYTE [DISTWIDTH] IN BLOOD BY AUTOMATED COUNT: 12.9 % (ref 10–15)
FERRITIN SERPL-MCNC: 8 NG/ML (ref 8–252)
GFR SERPL CREATININE-BSD FRML MDRD: 76 ML/MIN/1.73M2
GLUCOSE BLD-MCNC: 94 MG/DL (ref 70–99)
HCT VFR BLD AUTO: 34.2 % (ref 35–47)
HGB BLD-MCNC: 11.4 G/DL (ref 11.7–15.7)
IRON SATN MFR SERPL: 18 % (ref 15–46)
IRON SERPL-MCNC: 71 UG/DL (ref 35–180)
MCH RBC QN AUTO: 31.9 PG (ref 26.5–33)
MCHC RBC AUTO-ENTMCNC: 33.3 G/DL (ref 31.5–36.5)
MCV RBC AUTO: 96 FL (ref 78–100)
PLATELET # BLD AUTO: 289 10E3/UL (ref 150–450)
POTASSIUM BLD-SCNC: 3.6 MMOL/L (ref 3.4–5.3)
PROT SERPL-MCNC: 6.7 G/DL (ref 6.8–8.8)
RBC # BLD AUTO: 3.57 10E6/UL (ref 3.8–5.2)
SODIUM SERPL-SCNC: 138 MMOL/L (ref 133–144)
T3 SERPL-MCNC: 96 NG/DL (ref 60–181)
T4 FREE SERPL-MCNC: 0.88 NG/DL (ref 0.76–1.46)
TIBC SERPL-MCNC: 395 UG/DL (ref 240–430)
TSH SERPL DL<=0.005 MIU/L-ACNC: 1.93 MU/L (ref 0.4–4)
VIT B12 SERPL-MCNC: 227 PG/ML (ref 193–986)
WBC # BLD AUTO: 4.5 10E3/UL (ref 4–11)

## 2021-09-27 PROCEDURE — 83550 IRON BINDING TEST: CPT | Performed by: STUDENT IN AN ORGANIZED HEALTH CARE EDUCATION/TRAINING PROGRAM

## 2021-09-27 PROCEDURE — 82607 VITAMIN B-12: CPT | Performed by: STUDENT IN AN ORGANIZED HEALTH CARE EDUCATION/TRAINING PROGRAM

## 2021-09-27 PROCEDURE — 86376 MICROSOMAL ANTIBODY EACH: CPT | Performed by: STUDENT IN AN ORGANIZED HEALTH CARE EDUCATION/TRAINING PROGRAM

## 2021-09-27 PROCEDURE — 85027 COMPLETE CBC AUTOMATED: CPT | Performed by: STUDENT IN AN ORGANIZED HEALTH CARE EDUCATION/TRAINING PROGRAM

## 2021-09-27 PROCEDURE — 36415 COLL VENOUS BLD VENIPUNCTURE: CPT | Performed by: STUDENT IN AN ORGANIZED HEALTH CARE EDUCATION/TRAINING PROGRAM

## 2021-09-27 PROCEDURE — 82728 ASSAY OF FERRITIN: CPT | Performed by: STUDENT IN AN ORGANIZED HEALTH CARE EDUCATION/TRAINING PROGRAM

## 2021-09-27 PROCEDURE — 84480 ASSAY TRIIODOTHYRONINE (T3): CPT | Performed by: STUDENT IN AN ORGANIZED HEALTH CARE EDUCATION/TRAINING PROGRAM

## 2021-09-27 PROCEDURE — 84443 ASSAY THYROID STIM HORMONE: CPT | Performed by: STUDENT IN AN ORGANIZED HEALTH CARE EDUCATION/TRAINING PROGRAM

## 2021-09-27 PROCEDURE — 80053 COMPREHEN METABOLIC PANEL: CPT | Performed by: STUDENT IN AN ORGANIZED HEALTH CARE EDUCATION/TRAINING PROGRAM

## 2021-09-27 PROCEDURE — 84439 ASSAY OF FREE THYROXINE: CPT | Performed by: STUDENT IN AN ORGANIZED HEALTH CARE EDUCATION/TRAINING PROGRAM

## 2021-09-27 PROCEDURE — 99396 PREV VISIT EST AGE 40-64: CPT | Performed by: STUDENT IN AN ORGANIZED HEALTH CARE EDUCATION/TRAINING PROGRAM

## 2021-09-27 PROCEDURE — 99213 OFFICE O/P EST LOW 20 MIN: CPT | Mod: 25 | Performed by: STUDENT IN AN ORGANIZED HEALTH CARE EDUCATION/TRAINING PROGRAM

## 2021-09-27 PROCEDURE — 82306 VITAMIN D 25 HYDROXY: CPT | Performed by: STUDENT IN AN ORGANIZED HEALTH CARE EDUCATION/TRAINING PROGRAM

## 2021-09-27 RX ORDER — DIPHENHYDRAMINE HCL 25 MG
25 CAPSULE ORAL EVERY 6 HOURS PRN
COMMUNITY
End: 2022-07-21

## 2021-09-27 RX ORDER — TRAZODONE HYDROCHLORIDE 50 MG/1
25-50 TABLET, FILM COATED ORAL AT BEDTIME
Qty: 90 TABLET | Refills: 3 | Status: SHIPPED | OUTPATIENT
Start: 2021-09-27 | End: 2022-07-21

## 2021-09-27 RX ORDER — NALTREXONE HYDROCHLORIDE 50 MG/1
TABLET, FILM COATED ORAL
Qty: 90 TABLET | Refills: 1 | Status: SHIPPED | OUTPATIENT
Start: 2021-09-27 | End: 2022-07-21

## 2021-09-27 ASSESSMENT — ENCOUNTER SYMPTOMS
FEVER: 0
FREQUENCY: 1
CONSTIPATION: 1
PALPITATIONS: 0
COUGH: 0
HEARTBURN: 1
DYSURIA: 0
JOINT SWELLING: 0
ABDOMINAL PAIN: 0
BREAST MASS: 0
ARTHRALGIAS: 0
NAUSEA: 0
SHORTNESS OF BREATH: 0
DIARRHEA: 0
CHILLS: 0
EYE PAIN: 0
WEAKNESS: 0
DIZZINESS: 0
HEADACHES: 0
HEMATURIA: 0
HEMATOCHEZIA: 1
SORE THROAT: 0
PARESTHESIAS: 0
MYALGIAS: 0
NERVOUS/ANXIOUS: 1

## 2021-09-27 ASSESSMENT — MIFFLIN-ST. JEOR: SCORE: 1465.94

## 2021-09-27 NOTE — PATIENT INSTRUCTIONS
Start naltrexone 25 mg (1/2 of 50 mg tablet) once daily.    Start trazodone 1/2 tablet as needed for sleep.    Schedule appointment with general surgeon for hemorrhoids. [phone]      Preventive Health Recommendations  Female Ages 50 - 64    Yearly exam: See your health care provider every year in order to  o Review health changes.   o Discuss preventive care.    o Review your medicines if your doctor has prescribed any.      Get a Pap test every three years (unless you have an abnormal result and your provider advises testing more often).    If you get Pap tests with HPV test, you only need to test every 5 years, unless you have an abnormal result.     You do not need a Pap test if your uterus was removed (hysterectomy) and you have not had cancer.    You should be tested each year for STDs (sexually transmitted diseases) if you're at risk.     Have a mammogram every 1 to 2 years.    Have a colonoscopy at age 50, or have a yearly FIT test (stool test). These exams screen for colon cancer.      Have a cholesterol test every 5 years, or more often if advised.    Have a diabetes test (fasting glucose) every three years. If you are at risk for diabetes, you should have this test more often.     If you are at risk for osteoporosis (brittle bone disease), think about having a bone density scan (DEXA).    Shots: Get a flu shot each year. Get a tetanus shot every 10 years.    Nutrition:     Eat at least 5 servings of fruits and vegetables each day.    Eat whole-grain bread, whole-wheat pasta and brown rice instead of white grains and rice.    Get adequate Calcium and Vitamin D.     Lifestyle    Exercise at least 150 minutes a week (30 minutes a day, 5 days a week). This will help you control your weight and prevent disease.    Limit alcohol to one drink per day.    No smoking.     Wear sunscreen to prevent skin cancer.     See your dentist every six months for an exam and cleaning.    See your eye doctor every 1 to 2  years.

## 2021-09-27 NOTE — PROGRESS NOTES
SUBJECTIVE:   CC: Kenneth Swift is an 51 year old woman who presents for preventive health visit.       Patient has been advised of split billing requirements and indicates understanding: Yes  Healthy Habits:     Getting at least 3 servings of Calcium per day:  Yes    Bi-annual eye exam:  Yes    Dental care twice a year:  Yes    Sleep apnea or symptoms of sleep apnea:  Daytime drowsiness    Diet:  Vegetarian/vegan    Frequency of exercise:  4-5 days/week    Duration of exercise:  30-45 minutes    Taking medications regularly:  No    Medication side effects:  None    PHQ-2 Total Score: 2    Additional concerns today:  Yes              Today's PHQ-2 Score:   PHQ-2 (  [employer]) 2021   Q1: Little interest or pleasure in doing things 1   Q2: Feeling down, depressed or hopeless 1   PHQ-2 Score 2   Q1: Little interest or pleasure in doing things Several days   Q2: Feeling down, depressed or hopeless Several days   PHQ-2 Score 2       Abuse: Current or Past (Physical, Sexual or Emotional) - No  Do you feel safe in your environment? Yes    Have you ever done Advance Care Planning? (For example, a Health Directive, POLST, or a discussion with a medical provider or your loved ones about your wishes): Giving patien t information    Social History     Tobacco Use     Smoking status: Former Smoker     Packs/day: 0.50     Years: 2.00     Pack years: 1.00     Quit date: 10/28/1989     Years since quittin.9     Smokeless tobacco: Never Used   Substance Use Topics     Alcohol use: Yes     Comment: red wine - few nights weekly         Alcohol Use 2021   Prescreen: >3 drinks/day or >7 drinks/week? Yes   Prescreen: >3 drinks/day or >7 drinks/week? -   AUDIT SCORE  12     AUDIT - Alcohol Use Disorders Identification Test - Reproduced from the World Health Organization Audit 2001 (Second Edition) 2021   1.  How often do you have a drink containing alcohol? 4 or more times a week   2.  How many drinks containing  alcohol do you have on a typical day when you are drinking? 5 or 6   3.  How often do you have five or more drinks on one occasion? Weekly   4.  How often during the last year have you found that you were not able to stop drinking once you had started? Less than monthly   5.  How often during the last year have you failed to do what was normally expected of you because of drinking? Never   6.  How often during the last year have you needed a first drink in the morning to get yourself going after a heavy drinking session? Never   7.  How often during the last year have you had a feeling of guilt or remorse after drinking? Less than monthly   8.  How often during the last year have you been unable to remember what happened the night before because of your drinking? Less than monthly   9.  Have you or someone else been injured because of your drinking? No   10. Has a relative, friend, doctor or other health care worker been concerned about your drinking or suggested you cut down? No   TOTAL SCORE 12       Reviewed orders with patient.  Reviewed health maintenance and updated orders accordingly - Yes  Lab work is in process  Labs reviewed in EPIC  BP Readings from Last 3 Encounters:   09/27/21 126/78   03/03/20 124/60   04/15/19 126/74    Wt Readings from Last 3 Encounters:   09/27/21 81.7 kg (180 lb 1.6 oz)   03/03/20 84.9 kg (187 lb 3.2 oz)   04/15/19 81.2 kg (179 lb)                  Patient Active Problem List   Diagnosis     CARDIOVASCULAR SCREENING; LDL GOAL LESS THAN 160     Immunization not carried out because of patient refusal     Thyroid enlargement     Hashimoto's thyroiditis     Multinodular goiter     Vegetarianism     Bladder spasm     Urinary frequency     Sleep disturbance     History of vitamin D deficiency     History of non anemic vitamin B12 deficiency     Low back pain     Right leg paresthesias     Repetitive intrusions of sleep     Insomnia     Hypersomnia     Persistent disorder of initiating or  maintaining sleep     Other specified intestinal malabsorption     Overweight     Past Surgical History:   Procedure Laterality Date     RECONSTRUCT BREAST, IMPLANT PROSTHESIS, COMBINED       WISDOM ST GUIDEWIRE       Z ENLARGE BREAST      Description: Breast Surgery Enlargement Procedure;  Recorded: 2014;       Social History     Tobacco Use     Smoking status: Former Smoker     Packs/day: 0.50     Years: 2.00     Pack years: 1.00     Quit date: 10/28/1989     Years since quittin.9     Smokeless tobacco: Never Used   Substance Use Topics     Alcohol use: Yes     Comment: red wine - few nights weekly     Family History   Problem Relation Age of Onset     Diabetes Mother         30's insulin dependent     Circulatory Mother         heart valve replacement     Thyroid Disease Sister         Hashimoto's     Other - See Comments Sister         Insomnia     Cerebrovascular Disease No family hx of      Hypertension No family hx of          Current Outpatient Medications   Medication Sig Dispense Refill     diphenhydrAMINE (BENADRYL) 25 MG capsule Take 25 mg by mouth every 6 hours as needed for itching or allergies (to help sleep)       melatonin 3 MG tablet Take 1 tablet (3 mg) by mouth nightly as needed for sleep       Multiple Vitamin (MULTI-VITAMIN PO) Take  by mouth daily.       naltrexone (DEPADE/REVIA) 50 MG tablet Take 25 mg daily by mouth for one week. May increase to 50 mg daily if needed. 90 tablet 1     SYNTHROID 112 MCG tablet Take 1 tablet (112 mcg) by mouth daily By Dr. Brandon Fields endocrinologist 90 tablet 1     traZODone (DESYREL) 50 MG tablet Take 0.5-1 tablets (25-50 mg) by mouth At Bedtime 90 tablet 3     No Known Allergies  Recent Labs   Lab Test 20  1222 20  1223 19  1016 14  1058   LDL  --   --  105*  --    HDL  --   --  92  --    TRIG  --   --  70  --    CR  --  0.76 0.72  --    GFRESTIMATED  --  >90 >90  --    GFRESTBLACK  --  >90 >90  --    POTASSIUM  --   3.9 3.8  --    TSH 0.94 1.31  --    < >    < > = values in this interval not displayed.        Breast Cancer Screening:    Breast CA Risk Assessment (FHS-7) 9/22/2021   Do you have a family history of breast, colon, or ovarian cancer? No / Unknown         Mammogram Screening: Recommended annual mammography  Pertinent mammograms are reviewed under the imaging tab.    History of abnormal Pap smear: NO - age 30-65 PAP every 5 years with negative HPV co-testing recommended  PAP / HPV Latest Ref Rng & Units 3/20/2019 12/17/2014 6/21/2011   PAP (Historical) - NIL NIL NIL   HPV16 NEG:Negative Negative - -   HPV18 NEG:Negative Negative - -   HRHPV NEG:Negative Negative - -     Reviewed and updated as needed this visit by clinical staff                 Reviewed and updated as needed this visit by Provider                Past Medical History:   Diagnosis Date     Hashimoto's disease      Hypersomnia, unspecified 2015    sleep study     Immunization not carried out because of patient refusal 6/21/2011    tdap     Insomnia, unspecified 2015    sleep study     Persistent disorder of initiating or maintaining sleep 2015    sleep study     Repetitive intrusions of sleep 2015    sleep study     Shingles 2008    hand - resolved without complications     Vitamin B12 deficiency 2012     Vitamin D deficiency       Past Surgical History:   Procedure Laterality Date     RECONSTRUCT BREAST, IMPLANT PROSTHESIS, COMBINED       WISDOM ST GUIDEWIRE       ZZC ENLARGE BREAST      Description: Breast Surgery Enlargement Procedure;  Recorded: 04/16/2014;       Review of Systems   Constitutional: Negative for chills and fever.   HENT: Negative for congestion, ear pain, hearing loss and sore throat.    Eyes: Negative for pain and visual disturbance.   Respiratory: Negative for cough and shortness of breath.    Cardiovascular: Negative for chest pain, palpitations and peripheral edema.   Gastrointestinal: Positive for constipation, heartburn and  "hematochezia. Negative for abdominal pain, diarrhea and nausea.   Breasts:  Negative for tenderness, breast mass and discharge.   Genitourinary: Positive for frequency and vaginal bleeding. Negative for dysuria, genital sores, hematuria, pelvic pain, urgency and vaginal discharge.   Musculoskeletal: Negative for arthralgias, joint swelling and myalgias.   Skin: Negative for rash.   Neurological: Negative for dizziness, weakness, headaches and paresthesias.   Psychiatric/Behavioral: Negative for mood changes. The patient is nervous/anxious.           OBJECTIVE:   /78 (BP Location: Left arm, Patient Position: Sitting)   Pulse 105   Temp 97.8  F (36.6  C) (Temporal)   Ht 1.72 m (5' 7.72\")   Wt 81.7 kg (180 lb 1.6 oz)   SpO2 100%   BMI 27.61 kg/m    Physical Exam  GENERAL: healthy, alert and no distress  EYES: Eyes grossly normal to inspection, PERRL and conjunctivae and sclerae normal  HENT: ear canals and TM's normal, nose and mouth without ulcers or lesions  NECK: no adenopathy, no asymmetry, masses, or scars and thyroid normal to palpation  RESP: lungs clear to auscultation - no rales, rhonchi or wheezes  BREAST: breast lump, firm, fixed at 3 o'clock left breast; no palpable axillary masses or adenopathy  CV: regular rate and rhythm, normal S1 S2, no S3 or S4, no murmur, click or rub, no peripheral edema and peripheral pulses strong  ABDOMEN: soft, nontender, no hepatosplenomegaly, no masses and bowel sounds normal  MS: no gross musculoskeletal defects noted, no edema  SKIN: no suspicious lesions or rashes  NEURO: Normal strength and tone, mentation intact and speech normal  PSYCH: mentation appears normal, affect normal/bright    Diagnostic Test Results:  Labs reviewed in Epic    ASSESSMENT/PLAN:       ICD-10-CM    1. Routine general medical examination at a health care facility  Z00.00    2. Vegetarianism  Z78.9 Vitamin B12     Vitamin B12   3. Hashimoto's thyroiditis  E06.3 TSH     Thyroid peroxidase " antibody     T4, free     T3, total     T3, total     T4, free     Thyroid peroxidase antibody     TSH   4. History of vitamin D deficiency  Z86.39 Vitamin D Deficiency     Vitamin D Deficiency   5. Persistent disorder of initiating or maintaining sleep  G47.00 traZODone (DESYREL) 50 MG tablet   6. Iron deficiency anemia due to chronic blood loss  D50.0 Iron and iron binding capacity     Ferritin     CBC with platelets     CBC with platelets     Ferritin     Iron and iron binding capacity   7. Alcohol abuse  F10.10 naltrexone (DEPADE/REVIA) 50 MG tablet   8. Screening for diabetes mellitus  Z13.1 Comprehensive metabolic panel (BMP + Alb, Alk Phos, ALT, AST, Total. Bili, TP)     Comprehensive metabolic panel (BMP + Alb, Alk Phos, ALT, AST, Total. Bili, TP)   9. External hemorrhoids  K64.4 Adult General Surg Referral   10. Lump of breast, left  N63.20    11. Special screening for malignant neoplasms, colon  Z12.11 TOMAS([employer])     2. Patient request recheck due to restricted diet vegetarianism  3.  Is planning to schedule her yearly appointment with endocrinologist and wanted to get blood tests done to recheck thyroid function.  4.  Patient requested due to history of vitamin D deficiency and to ensure that she is taking an appropriate dose of supplementation.  5.  Has had difficulty sleeping.  She tried trazodone 50 mg in the past and had morning grogginess.  She would like to try 1/2 tablet to see if this helps.  She is also going to quit drinking alcohol to see if that helps.  6.  She has a history of heavy periods and low iron.  She takes iron during her menstrual cycle.  Recheck iron and hemoglobin today.  7.  She would like to quit drinking alcohol by use of naltrexone to suppress cravings.  We discussed use and potential side effects of the medication.  She is going to start with 25 mg and if needed increase to 50 mg daily.  9.  She had worsening of hemorrhoids recently due to constipation from  "taking iron.  She originally started having issues with hemorrhoids when she had her son 25 years ago.  The hemorrhoid is now externalized and bothersome so she would like to get it surgically excised.  10.  She has a lump in the left lateral breast.  She has silicone implants and is concerned about compression with mammography and risk for rupture from the compression.  She is going to check with her insurance to see if an MRI is covered and if not consider doing a diagnostic mammogram and ultrasound.  We discussed that we could try ultrasound and talk to the radiologist to see the efficacy in determining the character of the lump with just ultrasound but I discussed with her that likely it would not give us as much information as we would get with both mammogram and ultrasound.  She will message me and decide from what she finds out from her insurance which imaging study to order.    Patient has been advised of split billing requirements and indicates understanding: Yes  COUNSELING:  Reviewed preventive health counseling, as reflected in patient instructions       Regular exercise       Healthy diet/nutrition       Immunizations    Declined: Influenza, TDAP and Zoster due to Conscientious objector               Colon cancer screening    Estimated body mass index is 27.61 kg/m  as calculated from the following:    Height as of this encounter: 1.72 m (5' 7.72\").    Weight as of this encounter: 81.7 kg (180 lb 1.6 oz).    Weight management plan: Discussed healthy diet and exercise guidelines    She reports that she quit smoking about 31 years ago. She has a 1.00 pack-year smoking history. She has never used smokeless tobacco.      Counseling Resources:  ATP IV Guidelines  Pooled Cohorts Equation Calculator  Breast Cancer Risk Calculator  BRCA-Related Cancer Risk Assessment: FHS-7 Tool  FRAX Risk Assessment  ICSI Preventive Guidelines  Dietary Guidelines for Americans, 2010  USDA's MyPlate  ASA Prophylaxis  Lung CA " Screening    JENNY Das CNP  [facility]

## 2021-09-27 NOTE — H&P (VIEW-ONLY)
SUBJECTIVE:   CC: Kenneth Swift is an 51 year old woman who presents for preventive health visit.       Patient has been advised of split billing requirements and indicates understanding: Yes  Healthy Habits:     Getting at least 3 servings of Calcium per day:  Yes    Bi-annual eye exam:  Yes    Dental care twice a year:  Yes    Sleep apnea or symptoms of sleep apnea:  Daytime drowsiness    Diet:  Vegetarian/vegan    Frequency of exercise:  4-5 days/week    Duration of exercise:  30-45 minutes    Taking medications regularly:  No    Medication side effects:  None    PHQ-2 Total Score: 2    Additional concerns today:  Yes              Today's PHQ-2 Score:   PHQ-2 (  [employer]) 2021   Q1: Little interest or pleasure in doing things 1   Q2: Feeling down, depressed or hopeless 1   PHQ-2 Score 2   Q1: Little interest or pleasure in doing things Several days   Q2: Feeling down, depressed or hopeless Several days   PHQ-2 Score 2       Abuse: Current or Past (Physical, Sexual or Emotional) - No  Do you feel safe in your environment? Yes    Have you ever done Advance Care Planning? (For example, a Health Directive, POLST, or a discussion with a medical provider or your loved ones about your wishes): Giving patien t information    Social History     Tobacco Use     Smoking status: Former Smoker     Packs/day: 0.50     Years: 2.00     Pack years: 1.00     Quit date: 10/28/1989     Years since quittin.9     Smokeless tobacco: Never Used   Substance Use Topics     Alcohol use: Yes     Comment: red wine - few nights weekly         Alcohol Use 2021   Prescreen: >3 drinks/day or >7 drinks/week? Yes   Prescreen: >3 drinks/day or >7 drinks/week? -   AUDIT SCORE  12     AUDIT - Alcohol Use Disorders Identification Test - Reproduced from the World Health Organization Audit 2001 (Second Edition) 2021   1.  How often do you have a drink containing alcohol? 4 or more times a week   2.  How many drinks containing  alcohol do you have on a typical day when you are drinking? 5 or 6   3.  How often do you have five or more drinks on one occasion? Weekly   4.  How often during the last year have you found that you were not able to stop drinking once you had started? Less than monthly   5.  How often during the last year have you failed to do what was normally expected of you because of drinking? Never   6.  How often during the last year have you needed a first drink in the morning to get yourself going after a heavy drinking session? Never   7.  How often during the last year have you had a feeling of guilt or remorse after drinking? Less than monthly   8.  How often during the last year have you been unable to remember what happened the night before because of your drinking? Less than monthly   9.  Have you or someone else been injured because of your drinking? No   10. Has a relative, friend, doctor or other health care worker been concerned about your drinking or suggested you cut down? No   TOTAL SCORE 12       Reviewed orders with patient.  Reviewed health maintenance and updated orders accordingly - Yes  Lab work is in process  Labs reviewed in EPIC  BP Readings from Last 3 Encounters:   09/27/21 126/78   03/03/20 124/60   04/15/19 126/74    Wt Readings from Last 3 Encounters:   09/27/21 81.7 kg (180 lb 1.6 oz)   03/03/20 84.9 kg (187 lb 3.2 oz)   04/15/19 81.2 kg (179 lb)                  Patient Active Problem List   Diagnosis     CARDIOVASCULAR SCREENING; LDL GOAL LESS THAN 160     Immunization not carried out because of patient refusal     Thyroid enlargement     Hashimoto's thyroiditis     Multinodular goiter     Vegetarianism     Bladder spasm     Urinary frequency     Sleep disturbance     History of vitamin D deficiency     History of non anemic vitamin B12 deficiency     Low back pain     Right leg paresthesias     Repetitive intrusions of sleep     Insomnia     Hypersomnia     Persistent disorder of initiating or  maintaining sleep     Other specified intestinal malabsorption     Overweight     Past Surgical History:   Procedure Laterality Date     RECONSTRUCT BREAST, IMPLANT PROSTHESIS, COMBINED       WISDOM ST GUIDEWIRE       Z ENLARGE BREAST      Description: Breast Surgery Enlargement Procedure;  Recorded: 2014;       Social History     Tobacco Use     Smoking status: Former Smoker     Packs/day: 0.50     Years: 2.00     Pack years: 1.00     Quit date: 10/28/1989     Years since quittin.9     Smokeless tobacco: Never Used   Substance Use Topics     Alcohol use: Yes     Comment: red wine - few nights weekly     Family History   Problem Relation Age of Onset     Diabetes Mother         30's insulin dependent     Circulatory Mother         heart valve replacement     Thyroid Disease Sister         Hashimoto's     Other - See Comments Sister         Insomnia     Cerebrovascular Disease No family hx of      Hypertension No family hx of          Current Outpatient Medications   Medication Sig Dispense Refill     diphenhydrAMINE (BENADRYL) 25 MG capsule Take 25 mg by mouth every 6 hours as needed for itching or allergies (to help sleep)       melatonin 3 MG tablet Take 1 tablet (3 mg) by mouth nightly as needed for sleep       Multiple Vitamin (MULTI-VITAMIN PO) Take  by mouth daily.       naltrexone (DEPADE/REVIA) 50 MG tablet Take 25 mg daily by mouth for one week. May increase to 50 mg daily if needed. 90 tablet 1     SYNTHROID 112 MCG tablet Take 1 tablet (112 mcg) by mouth daily By Dr. Brandon Fields endocrinologist 90 tablet 1     traZODone (DESYREL) 50 MG tablet Take 0.5-1 tablets (25-50 mg) by mouth At Bedtime 90 tablet 3     No Known Allergies  Recent Labs   Lab Test 20  1222 20  1223 19  1016 14  1058   LDL  --   --  105*  --    HDL  --   --  92  --    TRIG  --   --  70  --    CR  --  0.76 0.72  --    GFRESTIMATED  --  >90 >90  --    GFRESTBLACK  --  >90 >90  --    POTASSIUM  --   3.9 3.8  --    TSH 0.94 1.31  --    < >    < > = values in this interval not displayed.        Breast Cancer Screening:    Breast CA Risk Assessment (FHS-7) 9/22/2021   Do you have a family history of breast, colon, or ovarian cancer? No / Unknown         Mammogram Screening: Recommended annual mammography  Pertinent mammograms are reviewed under the imaging tab.    History of abnormal Pap smear: NO - age 30-65 PAP every 5 years with negative HPV co-testing recommended  PAP / HPV Latest Ref Rng & Units 3/20/2019 12/17/2014 6/21/2011   PAP (Historical) - NIL NIL NIL   HPV16 NEG:Negative Negative - -   HPV18 NEG:Negative Negative - -   HRHPV NEG:Negative Negative - -     Reviewed and updated as needed this visit by clinical staff                 Reviewed and updated as needed this visit by Provider                Past Medical History:   Diagnosis Date     Hashimoto's disease      Hypersomnia, unspecified 2015    sleep study     Immunization not carried out because of patient refusal 6/21/2011    tdap     Insomnia, unspecified 2015    sleep study     Persistent disorder of initiating or maintaining sleep 2015    sleep study     Repetitive intrusions of sleep 2015    sleep study     Shingles 2008    hand - resolved without complications     Vitamin B12 deficiency 2012     Vitamin D deficiency       Past Surgical History:   Procedure Laterality Date     RECONSTRUCT BREAST, IMPLANT PROSTHESIS, COMBINED       WISDOM ST GUIDEWIRE       ZZC ENLARGE BREAST      Description: Breast Surgery Enlargement Procedure;  Recorded: 04/16/2014;       Review of Systems   Constitutional: Negative for chills and fever.   HENT: Negative for congestion, ear pain, hearing loss and sore throat.    Eyes: Negative for pain and visual disturbance.   Respiratory: Negative for cough and shortness of breath.    Cardiovascular: Negative for chest pain, palpitations and peripheral edema.   Gastrointestinal: Positive for constipation, heartburn and  "hematochezia. Negative for abdominal pain, diarrhea and nausea.   Breasts:  Negative for tenderness, breast mass and discharge.   Genitourinary: Positive for frequency and vaginal bleeding. Negative for dysuria, genital sores, hematuria, pelvic pain, urgency and vaginal discharge.   Musculoskeletal: Negative for arthralgias, joint swelling and myalgias.   Skin: Negative for rash.   Neurological: Negative for dizziness, weakness, headaches and paresthesias.   Psychiatric/Behavioral: Negative for mood changes. The patient is nervous/anxious.           OBJECTIVE:   /78 (BP Location: Left arm, Patient Position: Sitting)   Pulse 105   Temp 97.8  F (36.6  C) (Temporal)   Ht 1.72 m (5' 7.72\")   Wt 81.7 kg (180 lb 1.6 oz)   SpO2 100%   BMI 27.61 kg/m    Physical Exam  GENERAL: healthy, alert and no distress  EYES: Eyes grossly normal to inspection, PERRL and conjunctivae and sclerae normal  HENT: ear canals and TM's normal, nose and mouth without ulcers or lesions  NECK: no adenopathy, no asymmetry, masses, or scars and thyroid normal to palpation  RESP: lungs clear to auscultation - no rales, rhonchi or wheezes  BREAST: breast lump, firm, fixed at 3 o'clock left breast; no palpable axillary masses or adenopathy  CV: regular rate and rhythm, normal S1 S2, no S3 or S4, no murmur, click or rub, no peripheral edema and peripheral pulses strong  ABDOMEN: soft, nontender, no hepatosplenomegaly, no masses and bowel sounds normal  MS: no gross musculoskeletal defects noted, no edema  SKIN: no suspicious lesions or rashes  NEURO: Normal strength and tone, mentation intact and speech normal  PSYCH: mentation appears normal, affect normal/bright    Diagnostic Test Results:  Labs reviewed in Epic    ASSESSMENT/PLAN:       ICD-10-CM    1. Routine general medical examination at a health care facility  Z00.00    2. Vegetarianism  Z78.9 Vitamin B12     Vitamin B12   3. Hashimoto's thyroiditis  E06.3 TSH     Thyroid peroxidase " antibody     T4, free     T3, total     T3, total     T4, free     Thyroid peroxidase antibody     TSH   4. History of vitamin D deficiency  Z86.39 Vitamin D Deficiency     Vitamin D Deficiency   5. Persistent disorder of initiating or maintaining sleep  G47.00 traZODone (DESYREL) 50 MG tablet   6. Iron deficiency anemia due to chronic blood loss  D50.0 Iron and iron binding capacity     Ferritin     CBC with platelets     CBC with platelets     Ferritin     Iron and iron binding capacity   7. Alcohol abuse  F10.10 naltrexone (DEPADE/REVIA) 50 MG tablet   8. Screening for diabetes mellitus  Z13.1 Comprehensive metabolic panel (BMP + Alb, Alk Phos, ALT, AST, Total. Bili, TP)     Comprehensive metabolic panel (BMP + Alb, Alk Phos, ALT, AST, Total. Bili, TP)   9. External hemorrhoids  K64.4 Adult General Surg Referral   10. Lump of breast, left  N63.20    11. Special screening for malignant neoplasms, colon  Z12.11 TOMAS([employer])     2. Patient request recheck due to restricted diet vegetarianism  3.  Is planning to schedule her yearly appointment with endocrinologist and wanted to get blood tests done to recheck thyroid function.  4.  Patient requested due to history of vitamin D deficiency and to ensure that she is taking an appropriate dose of supplementation.  5.  Has had difficulty sleeping.  She tried trazodone 50 mg in the past and had morning grogginess.  She would like to try 1/2 tablet to see if this helps.  She is also going to quit drinking alcohol to see if that helps.  6.  She has a history of heavy periods and low iron.  She takes iron during her menstrual cycle.  Recheck iron and hemoglobin today.  7.  She would like to quit drinking alcohol by use of naltrexone to suppress cravings.  We discussed use and potential side effects of the medication.  She is going to start with 25 mg and if needed increase to 50 mg daily.  9.  She had worsening of hemorrhoids recently due to constipation from  "taking iron.  She originally started having issues with hemorrhoids when she had her son 25 years ago.  The hemorrhoid is now externalized and bothersome so she would like to get it surgically excised.  10.  She has a lump in the left lateral breast.  She has silicone implants and is concerned about compression with mammography and risk for rupture from the compression.  She is going to check with her insurance to see if an MRI is covered and if not consider doing a diagnostic mammogram and ultrasound.  We discussed that we could try ultrasound and talk to the radiologist to see the efficacy in determining the character of the lump with just ultrasound but I discussed with her that likely it would not give us as much information as we would get with both mammogram and ultrasound.  She will message me and decide from what she finds out from her insurance which imaging study to order.    Patient has been advised of split billing requirements and indicates understanding: Yes  COUNSELING:  Reviewed preventive health counseling, as reflected in patient instructions       Regular exercise       Healthy diet/nutrition       Immunizations    Declined: Influenza, TDAP and Zoster due to Conscientious objector               Colon cancer screening    Estimated body mass index is 27.61 kg/m  as calculated from the following:    Height as of this encounter: 1.72 m (5' 7.72\").    Weight as of this encounter: 81.7 kg (180 lb 1.6 oz).    Weight management plan: Discussed healthy diet and exercise guidelines    She reports that she quit smoking about 31 years ago. She has a 1.00 pack-year smoking history. She has never used smokeless tobacco.      Counseling Resources:  ATP IV Guidelines  Pooled Cohorts Equation Calculator  Breast Cancer Risk Calculator  BRCA-Related Cancer Risk Assessment: FHS-7 Tool  FRAX Risk Assessment  ICSI Preventive Guidelines  Dietary Guidelines for Americans, 2010  USDA's MyPlate  ASA Prophylaxis  Lung CA " Screening    JENNY Das CNP  [facility]

## 2021-09-28 ENCOUNTER — OFFICE VISIT (OUTPATIENT)
Dept: SURGERY | Facility: CLINIC | Age: 51
End: 2021-09-28
Attending: STUDENT IN AN ORGANIZED HEALTH CARE EDUCATION/TRAINING PROGRAM
Payer: COMMERCIAL

## 2021-09-28 ENCOUNTER — TELEPHONE (OUTPATIENT)
Dept: SURGERY | Facility: CLINIC | Age: 51
End: 2021-09-28

## 2021-09-28 VITALS
SYSTOLIC BLOOD PRESSURE: 132 MMHG | BODY MASS INDEX: 27.28 KG/M2 | WEIGHT: 180 LBS | HEIGHT: 68 IN | DIASTOLIC BLOOD PRESSURE: 78 MMHG | TEMPERATURE: 97.8 F

## 2021-09-28 DIAGNOSIS — K64.4 SKIN TAG OF ANUS: ICD-10-CM

## 2021-09-28 DIAGNOSIS — Z11.59 ENCOUNTER FOR SCREENING FOR OTHER VIRAL DISEASES: ICD-10-CM

## 2021-09-28 DIAGNOSIS — K64.5 THROMBOSED EXTERNAL HEMORRHOID: Primary | ICD-10-CM

## 2021-09-28 DIAGNOSIS — K64.4 EXTERNAL HEMORRHOIDS: ICD-10-CM

## 2021-09-28 DIAGNOSIS — Z12.11 ENCOUNTER FOR SCREENING COLONOSCOPY: ICD-10-CM

## 2021-09-28 LAB — THYROPEROXIDASE AB SERPL-ACNC: 19 IU/ML

## 2021-09-28 PROCEDURE — 99204 OFFICE O/P NEW MOD 45 MIN: CPT | Performed by: SURGERY

## 2021-09-28 ASSESSMENT — MIFFLIN-ST. JEOR: SCORE: 1465.21

## 2021-09-28 NOTE — LETTER
9/28/2021         RE: Kenneth Swift  [address]  [facility] [id number]        Dear Colleague,    Thank you for referring your patient, Kenneth Swift, to the [facility]. Please see a copy of my visit note below.        Assessment & Plan   Problem List Items Addressed This Visit     None      Visit Diagnoses     Thrombosed external hemorrhoid    -  Primary    External hemorrhoids        Encounter for screening colonoscopy        Relevant Orders    Case Request: COLONOSCOPY, WITH POLYPECTOMY AND BIOPSY    Skin tag of anus        BMI 27.0-27.9,adult             Patient has 2 residual skin tag and a resolving thrombosed external hemorrhoid.  No other obvious pathology was noted today on her anoscopy.  Never had a colonoscopy and has always done Cologuard.  Recommend that patient undergo a colonoscopy so that she does not have to do Cologuard every 3 years.  She was in agreement with this.  Thus she will not do her Cologuard this upcoming year and will undergo a colonoscopy.  I will talk to her about the risks of the colonoscopy on the day of surgery.  As for her skin tag, this can easily be removed in the office however it is quite tender.  I also recommend that patient do a fiber regimen on a daily basis to help counteract constipation did develop more hemorrhoids and potential more skin tags.  All of her questions were answered to her satisfaction.      60 minutes spent on the date of the encounter doing chart review, history and exam, documentation and further activities per the note      No follow-ups on file.    Jennifer Zamarripa MD  [facility]    Brian Cooper is a 51 year old who presents for the following health issues:    External hemorrhoids- been dealing with for the past 20 something years.  Pt was constipated a week ago and had likely a thrombosed last week.  Still having pain (dull ache) with every BM.  Some bleeding.  Stool is still firm from  "her iron intake.  Had hx of low Fe anemia.  Daily BM; less than 5 mins on toilet; doesn't have to strain too much.  Usually has firmer BMs due to Hasimoto.  Never had any hemorrhoids removed.  Hx of bleeding in the past.  Never had colonoscopy - been doing cologuard - neg thus far.  No famhx of colon cancer.      No pruritus; no drainage; no stool incontinent         Review of Systems   Constitutional, HEENT, cardiovascular, pulmonary, GI, , musculoskeletal, neuro, skin, endocrine and psych systems are negative, except as otherwise noted.      Objective    /78   Temp 97.8  F (36.6  C) (Temporal)   Ht 1.72 m (5' 7.7\")   Wt 81.6 kg (180 lb)   BMI 27.61 kg/m    Body mass index is 27.61 kg/m .  Physical Exam  Vitals reviewed.   HENT:      Head: Normocephalic.   Eyes:      Conjunctiva/sclera: Conjunctivae normal.   Cardiovascular:      Rate and Rhythm: Normal rate.   Pulmonary:      Effort: Pulmonary effort is normal.   Abdominal:      Palpations: Abdomen is soft.   Genitourinary:         Comments: Anoscopy: Small mildly irritated mucosa with small external hemorrhoids; no internal hemorrhoids; noted skin tags and resolving thrombosed external hemorrhoids.  No Fissures; no masses; no bleeding; normal sphincter tone.   Musculoskeletal:      Cervical back: Normal range of motion.   Neurological:      Mental Status: She is alert.                Again, thank you for allowing me to participate in the care of your patient.        Sincerely,        EmeryRedd Zamarripa MD    "

## 2021-09-28 NOTE — PROGRESS NOTES
Assessment & Plan   Problem List Items Addressed This Visit     None      Visit Diagnoses     Thrombosed external hemorrhoid    -  Primary    External hemorrhoids        Encounter for screening colonoscopy        Relevant Orders    Case Request: COLONOSCOPY, WITH POLYPECTOMY AND BIOPSY    Skin tag of anus        BMI 27.0-27.9,adult             Patient has 2 residual skin tag and a resolving thrombosed external hemorrhoid.  No other obvious pathology was noted today on her anoscopy.  Never had a colonoscopy and has always done Cologuard.  Recommend that patient undergo a colonoscopy so that she does not have to do Cologuard every 3 years.  She was in agreement with this.  Thus she will not do her Cologuard this upcoming year and will undergo a colonoscopy.  I will talk to her about the risks of the colonoscopy on the day of surgery.  As for her skin tag, this can easily be removed in the office however it is quite tender.  I also recommend that patient do a fiber regimen on a daily basis to help counteract constipation did develop more hemorrhoids and potential more skin tags.  All of her questions were answered to her satisfaction.      60 minutes spent on the date of the encounter doing chart review, history and exam, documentation and further activities per the note      No follow-ups on file.    [facility]  [facility] ROGELIO Cooper is a 51 year old who presents for the following health issues:    External hemorrhoids- been dealing with for the past 20 something years.  Pt was constipated a week ago and had likely a thrombosed last week.  Still having pain (dull ache) with every BM.  Some bleeding.  Stool is still firm from her iron intake.  Had hx of low Fe anemia.  Daily BM; less than 5 mins on toilet; doesn't have to strain too much.  Usually has firmer BMs due to Hasimoto.  Never had any hemorrhoids removed.  Hx of bleeding in the past.  Never had colonoscopy - been  "doing cologuard - neg thus far.  No famhx of colon cancer.      No pruritus; no drainage; no stool incontinent         Review of Systems   Constitutional, HEENT, cardiovascular, pulmonary, GI, , musculoskeletal, neuro, skin, endocrine and psych systems are negative, except as otherwise noted.      Objective    /78   Temp 97.8  F (36.6  C) (Temporal)   Ht 1.72 m (5' 7.7\")   Wt 81.6 kg (180 lb)   BMI 27.61 kg/m    Body mass index is 27.61 kg/m .  Physical Exam  Vitals reviewed.   HENT:      Head: Normocephalic.   Eyes:      Conjunctiva/sclera: Conjunctivae normal.   Cardiovascular:      Rate and Rhythm: Normal rate.   Pulmonary:      Effort: Pulmonary effort is normal.   Abdominal:      Palpations: Abdomen is soft.   Genitourinary:         Comments: Anoscopy: Small mildly irritated mucosa with small external hemorrhoids; no internal hemorrhoids; noted skin tags and resolving thrombosed external hemorrhoids.  No Fissures; no masses; no bleeding; normal sphincter tone.   Musculoskeletal:      Cervical back: Normal range of motion.   Neurological:      Mental Status: She is alert.            "

## 2021-09-28 NOTE — LETTER

## 2021-10-03 ENCOUNTER — LAB (OUTPATIENT)
Dept: LAB | Facility: CLINIC | Age: 51
End: 2021-10-03
Payer: COMMERCIAL

## 2021-10-03 DIAGNOSIS — Z11.59 ENCOUNTER FOR SCREENING FOR OTHER VIRAL DISEASES: ICD-10-CM

## 2021-10-03 PROCEDURE — U0005 INFEC AGEN DETEC AMPLI PROBE: HCPCS

## 2021-10-03 PROCEDURE — U0003 INFECTIOUS AGENT DETECTION BY NUCLEIC ACID (DNA OR RNA); SEVERE ACUTE RESPIRATORY SYNDROME CORONAVIRUS 2 (SARS-COV-2) (CORONAVIRUS DISEASE [COVID-19]), AMPLIFIED PROBE TECHNIQUE, MAKING USE OF HIGH THROUGHPUT TECHNOLOGIES AS DESCRIBED BY CMS-2020-01-R: HCPCS

## 2021-10-04 LAB — SARS-COV-2 RNA RESP QL NAA+PROBE: NEGATIVE

## 2021-10-07 ENCOUNTER — HOSPITAL ENCOUNTER (OUTPATIENT)
Facility: CLINIC | Age: 51
Discharge: HOME | End: 2021-10-07
Attending: SURGERY | Admitting: SURGERY
Payer: COMMERCIAL

## 2021-10-07 ENCOUNTER — ANESTHESIA EVENT (OUTPATIENT)
Dept: GASTROENTEROLOGY | Facility: CLINIC | Age: 51
End: 2021-10-07
Payer: COMMERCIAL

## 2021-10-07 ENCOUNTER — ANESTHESIA (OUTPATIENT)
Dept: GASTROENTEROLOGY | Facility: CLINIC | Age: 51
End: 2021-10-07
Payer: COMMERCIAL

## 2021-10-07 VITALS
SYSTOLIC BLOOD PRESSURE: 125 MMHG | RESPIRATION RATE: 18 BRPM | TEMPERATURE: 98 F | DIASTOLIC BLOOD PRESSURE: 97 MMHG | HEART RATE: 80 BPM | OXYGEN SATURATION: 99 %

## 2021-10-07 LAB — COLONOSCOPY: NORMAL

## 2021-10-07 PROCEDURE — 250N000011 HC RX IP 250 OP 636: Performed by: NURSE ANESTHETIST, CERTIFIED REGISTERED

## 2021-10-07 PROCEDURE — 250N000009 HC RX 250: Performed by: NURSE ANESTHETIST, CERTIFIED REGISTERED

## 2021-10-07 PROCEDURE — 45385 COLONOSCOPY W/LESION REMOVAL: CPT | Mod: PT | Performed by: SURGERY

## 2021-10-07 PROCEDURE — 45380 COLONOSCOPY AND BIOPSY: CPT | Performed by: SURGERY

## 2021-10-07 PROCEDURE — 370N000017 HC ANESTHESIA TECHNICAL FEE, PER MIN: Performed by: SURGERY

## 2021-10-07 PROCEDURE — 88305 TISSUE EXAM BY PATHOLOGIST: CPT | Mod: TC | Performed by: SURGERY

## 2021-10-07 RX ORDER — METOPROLOL TARTRATE 1 MG/ML
1-2 INJECTION, SOLUTION INTRAVENOUS EVERY 5 MIN PRN
Status: CANCELLED | OUTPATIENT
Start: 2021-10-07

## 2021-10-07 RX ORDER — SODIUM CHLORIDE, SODIUM LACTATE, POTASSIUM CHLORIDE, CALCIUM CHLORIDE 600; 310; 30; 20 MG/100ML; MG/100ML; MG/100ML; MG/100ML
INJECTION, SOLUTION INTRAVENOUS CONTINUOUS
Status: DISCONTINUED | OUTPATIENT
Start: 2021-10-07 | End: 2021-10-07 | Stop reason: HOSPADM

## 2021-10-07 RX ORDER — PROPOFOL 10 MG/ML
INJECTION, EMULSION INTRAVENOUS PRN
Status: DISCONTINUED | OUTPATIENT
Start: 2021-10-07 | End: 2021-10-07

## 2021-10-07 RX ORDER — HYDRALAZINE HYDROCHLORIDE 20 MG/ML
2.5-5 INJECTION INTRAMUSCULAR; INTRAVENOUS EVERY 10 MIN PRN
Status: CANCELLED | OUTPATIENT
Start: 2021-10-07

## 2021-10-07 RX ORDER — ALBUTEROL SULFATE 0.83 MG/ML
2.5 SOLUTION RESPIRATORY (INHALATION) EVERY 4 HOURS PRN
Status: CANCELLED | OUTPATIENT
Start: 2021-10-07

## 2021-10-07 RX ORDER — ONDANSETRON 4 MG/1
4 TABLET, ORALLY DISINTEGRATING ORAL EVERY 30 MIN PRN
Status: CANCELLED | OUTPATIENT
Start: 2021-10-07

## 2021-10-07 RX ORDER — HYDROMORPHONE HYDROCHLORIDE 1 MG/ML
0.2 INJECTION, SOLUTION INTRAMUSCULAR; INTRAVENOUS; SUBCUTANEOUS EVERY 5 MIN PRN
Status: CANCELLED | OUTPATIENT
Start: 2021-10-07

## 2021-10-07 RX ORDER — SODIUM CHLORIDE, SODIUM LACTATE, POTASSIUM CHLORIDE, CALCIUM CHLORIDE 600; 310; 30; 20 MG/100ML; MG/100ML; MG/100ML; MG/100ML
INJECTION, SOLUTION INTRAVENOUS CONTINUOUS
Status: CANCELLED | OUTPATIENT
Start: 2021-10-07

## 2021-10-07 RX ORDER — LIDOCAINE HYDROCHLORIDE 20 MG/ML
INJECTION, SOLUTION INFILTRATION; PERINEURAL PRN
Status: DISCONTINUED | OUTPATIENT
Start: 2021-10-07 | End: 2021-10-07

## 2021-10-07 RX ORDER — MEPERIDINE HYDROCHLORIDE 25 MG/ML
12.5 INJECTION INTRAMUSCULAR; INTRAVENOUS; SUBCUTANEOUS
Status: CANCELLED | OUTPATIENT
Start: 2021-10-07

## 2021-10-07 RX ORDER — ONDANSETRON 2 MG/ML
4 INJECTION INTRAMUSCULAR; INTRAVENOUS EVERY 30 MIN PRN
Status: CANCELLED | OUTPATIENT
Start: 2021-10-07

## 2021-10-07 RX ORDER — LIDOCAINE 40 MG/G
CREAM TOPICAL
Status: DISCONTINUED | OUTPATIENT
Start: 2021-10-07 | End: 2021-10-07 | Stop reason: HOSPADM

## 2021-10-07 RX ORDER — OXYCODONE HYDROCHLORIDE 5 MG/1
5 TABLET ORAL EVERY 4 HOURS PRN
Status: CANCELLED | OUTPATIENT
Start: 2021-10-07

## 2021-10-07 RX ORDER — PROPOFOL 10 MG/ML
INJECTION, EMULSION INTRAVENOUS CONTINUOUS PRN
Status: DISCONTINUED | OUTPATIENT
Start: 2021-10-07 | End: 2021-10-07

## 2021-10-07 RX ORDER — FENTANYL CITRATE 50 UG/ML
25 INJECTION, SOLUTION INTRAMUSCULAR; INTRAVENOUS EVERY 5 MIN PRN
Status: CANCELLED | OUTPATIENT
Start: 2021-10-07

## 2021-10-07 RX ORDER — FENTANYL CITRATE 50 UG/ML
25 INJECTION, SOLUTION INTRAMUSCULAR; INTRAVENOUS
Status: CANCELLED | OUTPATIENT
Start: 2021-10-07

## 2021-10-07 RX ADMIN — PROPOFOL 50 MG: 10 INJECTION, EMULSION INTRAVENOUS at 11:30

## 2021-10-07 RX ADMIN — PROPOFOL 30 MG: 10 INJECTION, EMULSION INTRAVENOUS at 11:26

## 2021-10-07 RX ADMIN — PROPOFOL 200 MCG/KG/MIN: 10 INJECTION, EMULSION INTRAVENOUS at 11:24

## 2021-10-07 RX ADMIN — PROPOFOL 50 MG: 10 INJECTION, EMULSION INTRAVENOUS at 11:24

## 2021-10-07 RX ADMIN — PROPOFOL 30 MG: 10 INJECTION, EMULSION INTRAVENOUS at 11:28

## 2021-10-07 RX ADMIN — LIDOCAINE HYDROCHLORIDE 60 MG: 20 INJECTION, SOLUTION INFILTRATION; PERINEURAL at 11:24

## 2021-10-07 NOTE — INTERVAL H&P NOTE
I have reviewed the surgical (or preoperative) H&P that is linked to this encounter, and examined the patient. There are no significant changes    1st colonoscopy; always done cologuard - been negative.  No famhx of colonc kareemer; saw in office for hemorrhoids    [facility] Zamarripa, DO

## 2021-10-07 NOTE — LETTER
Kenneth Swift  [address] [place] [id number]      October 11, 2021    Dear Kenneth,  This letter is written to inform you of the results of your recent colonoscopy.  Your examination showed polyp(s) in your transverse colon and rectum. All polyps were removed in their entirety and sent for review by a pathologist. As you will see on the pathology report below, the tissue(s) were hyperplastic polyps. Your examination was otherwise without abnormality.    Final Diagnosis   A.  Hepatic flexure polyp:    - Hyperplastic polyp    B.  Rectal polyp:   - Hyperplastic polyp         Hyperplastic polyps are entirely benign (non-cancerous) and rarely associated with the development of additional polyps or colorectal cancer.    Given these findings,I recommend that you undergo a repeat colonoscopy in ten years for screening. We will enter you into a recall system so you receive a reminder closer to the time that you are due for repeat examination.     Please remember that this recommendation is made with the understanding that you are not experiencing persistent changes in bowel function, bleeding per rectum, and/or significant abdominal pain. If you experience these symptoms, please contact your primary care provider for a further evaluation.     If you have any questions or concerns about the results of your colonoscopy or the appropriate follow-up, please contact my assistant at [phone]    Sincerely,        [facility],   [facility]  ___

## 2021-10-07 NOTE — ANESTHESIA POSTPROCEDURE EVALUATION
Patient: Kenneth Swift    Procedure: Procedure(s):  COLONOSCOPY, WITH POLYPECTOMY       Diagnosis:Encounter for screening colonoscopy [Z12.11]  Diagnosis Additional Information: No value filed.    Anesthesia Type:  MAC    Note:  Disposition: Outpatient   Postop Pain Control: Uneventful            Sign Out: Well controlled pain   PONV: No   Neuro/Psych: Uneventful            Sign Out: Acceptable/Baseline neuro status   Airway/Respiratory: Uneventful            Sign Out: Acceptable/Baseline resp. status   CV/Hemodynamics: Uneventful            Sign Out: Acceptable CV status   Other NRE: NONE   DID A NON-ROUTINE EVENT OCCUR? No    Event details/Postop Comments:  Pt was happy with anesthesia care.  No complications.  I will follow up with the pt if needed.           Last vitals:  Vitals Value Taken Time   /68 10/07/21 1159   Temp     Pulse 74 10/07/21 1159   Resp     SpO2 100 % 10/07/21 1200   Vitals shown include unvalidated device data.    Electronically Signed By: JENNY Borjas CRNA  October 7, 2021  12:01 PM

## 2021-10-07 NOTE — ANESTHESIA CARE TRANSFER NOTE
Patient: Kenneth Swift    Procedure: Procedure(s):  COLONOSCOPY, WITH POLYPECTOMY       Diagnosis: Encounter for screening colonoscopy [Z12.11]  Diagnosis Additional Information: No value filed.    Anesthesia Type:   MAC     Note:    Oropharynx: oropharynx clear of all foreign objects and spontaneously breathing  Level of Consciousness: drowsy  Oxygen Supplementation: room air    Independent Airway: airway patency satisfactory and stable  Dentition: dentition unchanged  Vital Signs Stable: post-procedure vital signs reviewed and stable  Report to RN Given: handoff report given  Patient transferred to: Phase II    Handoff Report: Identifed the Patient, Identified the Reponsible Provider, Reviewed the pertinent medical history, Discussed the surgical course, Reviewed Intra-OP anesthesia mangement and issues during anesthesia, Set expectations for post-procedure period and Allowed opportunity for questions and acknowledgement of understanding      Vitals:  Vitals Value Taken Time   /68 10/07/21 1159   Temp     Pulse 74 10/07/21 1159   Resp     SpO2 100 % 10/07/21 1200   Vitals shown include unvalidated device data.    Electronically Signed By: JENNY Borjas CRNA  October 7, 2021  12:01 PM

## 2021-10-07 NOTE — DISCHARGE INSTRUCTIONS
[facility]    Home Care Following Endoscopy          Activity:    You have just undergone an endoscopic procedure usually performed with conscious sedation.  Do not work or operate machinery (including a car) for at least 12 hours.      I encourage you to walk and attempt to pass this air as soon as possible.    Diet:    Return to the diet you were on before your procedure but eat lightly for the first 12-24 hours.    Drink plenty of water.    Resume any regular medications unless otherwise advised by your physician.  Please begin any new medication prescribed as a result of your procedure as directed by your physician.     If you had any biopsy or polyp removed please refrain from aspirin or aspirin products for 2 days.  If on Coumadin please restart as instructed by your physician.   Pain:    You may take Tylenol as needed for pain.  Expected Recovery:    You can expect some mild abdominal fullness and/or discomfort due to the air used to inflate your intestinal tract. It is also normal to have a mild sore throat after upper endoscopy.    Call Your Physician if You Have:    After Colonoscopy:  o Worsening persisting abdominal pain which is worse with activity.  o Fevers (>101 degrees F), chills or shakes.  o Passage of continued blood with bowel movements.   Any questions or concerns about your recovery, please call [phone] or after hours [phone] Nurse Advice Line.    Follow-up Care:    You should receive a call or letter with your results within 1 week. Please call if you have not received a notification of your results.  If asked to return to clinic please make an appointment 1 week after your procedure.  Call [phone].

## 2021-10-07 NOTE — ANESTHESIA PREPROCEDURE EVALUATION
Anesthesia Pre-Procedure Evaluation    Patient: Kenneth Swift   MRN: [id number] : 1970        Preoperative Diagnosis: Encounter for screening colonoscopy [Z12.11]    Procedure : Procedure(s):  COLONOSCOPY, WITH POLYPECTOMY AND BIOPSY          Past Medical History:   Diagnosis Date     Hashimoto's disease      Hypersomnia, unspecified     sleep study     Immunization not carried out because of patient refusal 2011    tdap     Insomnia, unspecified     sleep study     Persistent disorder of initiating or maintaining sleep     sleep study     Repetitive intrusions of sleep     sleep study     Shingles 2008    hand - resolved without complications     Vitamin B12 deficiency      Vitamin D deficiency       Past Surgical History:   Procedure Laterality Date     RECONSTRUCT BREAST, IMPLANT PROSTHESIS, COMBINED       WISDOM ST GUIDEWIRE       ZZC ENLARGE BREAST      Description: Breast Surgery Enlargement Procedure;  Recorded: 2014;      No Known Allergies   Social History     Tobacco Use     Smoking status: Former Smoker     Packs/day: 0.50     Years: 2.00     Pack years: 1.00     Quit date: 10/28/1989     Years since quittin.9     Smokeless tobacco: Never Used   Substance Use Topics     Alcohol use: Yes     Comment: red wine - few nights weekly      Wt Readings from Last 1 Encounters:   21 81.6 kg (180 lb)        Anesthesia Evaluation   Pt has had prior anesthetic. Type: General and MAC.    No history of anesthetic complications       ROS/MED HX  ENT/Pulmonary:  - neg pulmonary ROS     Neurologic:  - neg neurologic ROS     Cardiovascular:  - neg cardiovascular ROS   (+) -----No previous cardiac testing     METS/Exercise Tolerance:     Hematologic:  - neg hematologic  ROS     Musculoskeletal:  - neg musculoskeletal ROS     GI/Hepatic:  - neg GI/hepatic ROS  (-) GERD   Renal/Genitourinary:  - neg Renal ROS     Endo:     (+) thyroid problem,  hoshimoto's,      Psychiatric/Substance Use:  - neg psychiatric ROS     Infectious Disease:  - neg infectious disease ROS     Malignancy:  - neg malignancy ROS     Other:  - neg other ROS          Physical Exam    Airway        Mallampati: II   TM distance: > 3 FB   Neck ROM: full   Mouth opening: > 3 cm    Respiratory Devices and Support         Dental  no notable dental history         Cardiovascular   cardiovascular exam normal       Rhythm and rate: regular and normal     Pulmonary   pulmonary exam normal        breath sounds clear to auscultation           OUTSIDE LABS:  CBC:   Lab Results   Component Value Date    WBC 4.5 09/27/2021    WBC 6.0 09/22/2020    HGB 11.4 (L) 09/27/2021    HGB 12.0 09/22/2020    HCT 34.2 (L) 09/27/2021    HCT 36.2 09/22/2020     09/27/2021     09/22/2020     BMP:   Lab Results   Component Value Date     09/27/2021     09/22/2020    POTASSIUM 3.6 09/27/2021    POTASSIUM 3.9 09/22/2020    CHLORIDE 106 09/27/2021    CHLORIDE 106 09/22/2020    CO2 28 09/27/2021    CO2 27 09/22/2020    BUN 10 09/27/2021    BUN 13 09/22/2020    CR 0.87 09/27/2021    CR 0.76 09/22/2020    GLC 94 09/27/2021    GLC 95 09/22/2020     COAGS: No results found for: PTT, INR, FIBR  POC: No results found for: BGM, HCG, HCGS  HEPATIC:   Lab Results   Component Value Date    ALBUMIN 3.6 09/27/2021    PROTTOTAL 6.7 (L) 09/27/2021    ALT 16 09/27/2021    AST 13 09/27/2021    ALKPHOS 64 09/27/2021    BILITOTAL 0.3 09/27/2021     OTHER:   Lab Results   Component Value Date    JAJA 8.3 (L) 09/27/2021    TSH 1.93 09/27/2021    T4 0.88 09/27/2021    T3 96 09/27/2021       Anesthesia Plan    ASA Status:  2   NPO Status:  NPO Appropriate    Anesthesia Type: MAC.     - Reason for MAC: straight local not clinically adequate   Induction: Intravenous, Propofol.   Maintenance: TIVA.        Consents    Anesthesia Plan(s) and associated risks, benefits, and realistic alternatives discussed. Questions answered and  patient/representative(s) expressed understanding.     - Discussed with:  Patient      - Extended Intubation/Ventilatory Support Discussed: No.      - Patient is DNR/DNI Status: No    Use of blood products discussed: No .     Postoperative Care            Comments:    The risks and benefits of anesthesia, and the alternatives where applicable, have been discussed with the patient, and they wish to proceed.            JENNY Borjas CRNA

## 2021-10-11 LAB
PATH REPORT.COMMENTS IMP SPEC: NORMAL
PATH REPORT.COMMENTS IMP SPEC: NORMAL
PATH REPORT.FINAL DX SPEC: NORMAL
PATH REPORT.GROSS SPEC: NORMAL
PATH REPORT.MICROSCOPIC SPEC OTHER STN: NORMAL
PATH REPORT.RELEVANT HX SPEC: NORMAL
PHOTO IMAGE: NORMAL

## 2021-10-11 PROCEDURE — 88305 TISSUE EXAM BY PATHOLOGIST: CPT | Mod: 26 | Performed by: PATHOLOGY

## 2021-10-19 ENCOUNTER — ANCILLARY PROCEDURE (OUTPATIENT)
Dept: MRI IMAGING | Facility: CLINIC | Age: 51
End: 2021-10-19
Attending: STUDENT IN AN ORGANIZED HEALTH CARE EDUCATION/TRAINING PROGRAM
Payer: COMMERCIAL

## 2021-10-19 DIAGNOSIS — N63.20 LUMP OF BREAST, LEFT: ICD-10-CM

## 2021-10-19 DIAGNOSIS — Z98.82 SILICONE BREAST IMPLANT IN SITU: ICD-10-CM

## 2021-10-19 PROCEDURE — A9585 GADOBUTROL INJECTION: HCPCS | Performed by: RADIOLOGY

## 2021-10-19 PROCEDURE — 77048 MRI BREAST C-+ W/CAD UNI: CPT | Mod: LT | Performed by: RADIOLOGY

## 2021-10-19 RX ORDER — GADOBUTROL 604.72 MG/ML
10 INJECTION INTRAVENOUS ONCE
Status: COMPLETED | OUTPATIENT
Start: 2021-10-19 | End: 2021-10-19

## 2021-10-19 RX ADMIN — GADOBUTROL 8.5 ML: 604.72 INJECTION INTRAVENOUS at 15:37

## 2021-10-20 DIAGNOSIS — N63.20 BREAST MASS, LEFT: Primary | ICD-10-CM

## 2021-10-24 ENCOUNTER — HEALTH MAINTENANCE LETTER (OUTPATIENT)
Age: 51
End: 2021-10-24

## 2021-10-28 ENCOUNTER — TELEPHONE (OUTPATIENT)
Dept: SURGERY | Facility: CLINIC | Age: 51
End: 2021-10-28

## 2021-10-28 NOTE — TELEPHONE ENCOUNTER
"See last office visit note from Dr. Zamarripa below:    \"As for her skin tag, this can easily be removed in the office however it is quite tender.\"    Please call patient and help schedule for a 45 min long appt with the procedure room in clinic with Dr. Zamarripa for skin tag removal.     Priya Cason RN on 10/28/2021 at 3:26 PM        "

## 2021-10-28 NOTE — TELEPHONE ENCOUNTER
Reason for Call:  Other appointment    Detailed comments: patient calling to see if she is supposed to schedule an office visit or OR visit for her skin tags? Please call her back.     Phone Number Patient can be reached at: Home number on file [phone] (home)    Best Time: any    Can we leave a detailed message on this number? YES    Call taken on 10/28/2021 at 11:28 AM by Angeles Sanchez

## 2021-11-02 ENCOUNTER — ANCILLARY PROCEDURE (OUTPATIENT)
Dept: MAMMOGRAPHY | Facility: CLINIC | Age: 51
End: 2021-11-02
Attending: STUDENT IN AN ORGANIZED HEALTH CARE EDUCATION/TRAINING PROGRAM
Payer: COMMERCIAL

## 2021-11-02 ENCOUNTER — ANCILLARY PROCEDURE (OUTPATIENT)
Dept: ULTRASOUND IMAGING | Facility: CLINIC | Age: 51
End: 2021-11-02
Attending: STUDENT IN AN ORGANIZED HEALTH CARE EDUCATION/TRAINING PROGRAM
Payer: COMMERCIAL

## 2021-11-02 DIAGNOSIS — N63.20 BREAST MASS, LEFT: ICD-10-CM

## 2021-11-02 DIAGNOSIS — R92.8 ABNORMAL FINDING ON BREAST IMAGING: Primary | ICD-10-CM

## 2021-11-02 PROCEDURE — 76642 ULTRASOUND BREAST LIMITED: CPT | Mod: LT | Performed by: RADIOLOGY

## 2021-11-02 PROCEDURE — 77066 DX MAMMO INCL CAD BI: CPT | Performed by: RADIOLOGY

## 2021-11-02 PROCEDURE — 88305 TISSUE EXAM BY PATHOLOGIST: CPT | Performed by: RADIOLOGY

## 2021-11-02 PROCEDURE — 19083 BX BREAST 1ST LESION US IMAG: CPT | Mod: LT | Performed by: RADIOLOGY

## 2021-11-02 PROCEDURE — G0279 TOMOSYNTHESIS, MAMMO: HCPCS | Performed by: RADIOLOGY

## 2021-11-03 ENCOUNTER — OFFICE VISIT (OUTPATIENT)
Dept: SURGERY | Facility: CLINIC | Age: 51
End: 2021-11-03
Payer: COMMERCIAL

## 2021-11-03 VITALS
WEIGHT: 180 LBS | SYSTOLIC BLOOD PRESSURE: 116 MMHG | TEMPERATURE: 97.6 F | BODY MASS INDEX: 27.28 KG/M2 | DIASTOLIC BLOOD PRESSURE: 66 MMHG | HEIGHT: 68 IN

## 2021-11-03 DIAGNOSIS — K64.4 SKIN TAG OF PERIANAL REGION: ICD-10-CM

## 2021-11-03 DIAGNOSIS — Z09 S/P EXCISION OF SKIN LESION, FOLLOW-UP EXAM: Primary | ICD-10-CM

## 2021-11-03 PROCEDURE — 46220 EXCISE ANAL EXT TAG/PAPILLA: CPT | Performed by: SURGERY

## 2021-11-03 RX ORDER — DIAZEPAM 5 MG
5 TABLET ORAL EVERY 8 HOURS PRN
Qty: 10 TABLET | Refills: 0 | Status: SHIPPED | OUTPATIENT
Start: 2021-11-03 | End: 2021-11-06

## 2021-11-03 ASSESSMENT — MIFFLIN-ST. JEOR: SCORE: 1465.21

## 2021-11-03 NOTE — LETTER
11/3/2021         RE: Kenneth Swift  [address] [facility] [address]        Dear Colleague,    Thank you for referring your patient, Kenneth Swift, to the [facility]. Please see a copy of my visit note below.    The History and Physical has been reviewed, the patient has been examined and no changes have occurred in the patient's condition since the H & P was completed.       EmeryRedd Zamarripa, DO        Again, thank you for allowing me to participate in the care of your patient.        Sincerely,        Jennifer Zamarripa MD

## 2021-11-03 NOTE — PROGRESS NOTES
The History and Physical has been reviewed, the patient has been examined and no changes have occurred in the patient's condition since the H & P was completed.       [facility], DO

## 2021-11-03 NOTE — PROCEDURES
EXCISION PROCEDURE NOTE    Name: [facility]: [unfilled]   : 1970, 51 year old Room/Bed: Room/bed info not found   CSN: [id number] Admission: No admission date for patient encounter.   MRN: [id number] Today: 11/3/2021,     PCP: No Ref-Primary, Physician Attending: No att. providers found       PROCEDURE:   1. Excision of perianal skin tag      INDICATION: Perianal skin tag    PRE-PROCEDURE     : Jennifer Zamarripa MD  CONSENT: signed an Informed Consent Document.      PROCEDURE     The perianal area was prepped and appropriately anesthetized with 1% lidocaine with epinephrine. Utilizing a #15 blade scalpel, an incision was made over the area of the protruding skin tag at the anterior midline and left lateral.  Utilizing a curved stat I grabbed these with the tongue of the hemostat utilizing a #15 blade scalpel the entire skin tag was completely excised.  I then used a 3-0 Vicryl to primary repair this area.  The sutures tightened and the curved stat is released.  Hemostasis was achieved.  Midline skin tag was excised in a similar fashion.  Hemostasis was achieved at the end of the case.  Incision was covered with panty liner.  The procedure was well tolerated and without complications. Specimen was not sent to Pathology      POST-PROCEDURE     The patient tolerated the procedure well    COMPLICATIONS: none    Plan:  1. Instructed to keep the wound dry and covered for 24-48h and clean thereafter.  2. Warning signs of infection were reviewed.    3. Recommended that the patient use valium 5mg  (prescribed) as needed for pain.         Electronically signed by: Jennifer Zamarripa MD; 11/3/2021

## 2021-11-08 ENCOUNTER — TELEPHONE (OUTPATIENT)
Dept: GENERAL RADIOLOGY | Facility: CLINIC | Age: 51
End: 2021-11-08
Payer: COMMERCIAL

## 2021-11-08 NOTE — TELEPHONE ENCOUNTER
Spoke with patient regarding left breast biopsy results, which indicate benign fibroadenomatoid change and fibrocystic changes with papillary hyperplasia. Notified patient that the radiologist's recommendation is a six month follow-up left breast ultrasound. Patient verbalized understanding of these results and all questions answered to her satisfaction.

## 2021-11-23 ENCOUNTER — OFFICE VISIT (OUTPATIENT)
Dept: SURGERY | Facility: CLINIC | Age: 51
End: 2021-11-23
Payer: COMMERCIAL

## 2021-11-23 VITALS
BODY MASS INDEX: 27.31 KG/M2 | DIASTOLIC BLOOD PRESSURE: 78 MMHG | SYSTOLIC BLOOD PRESSURE: 116 MMHG | OXYGEN SATURATION: 98 % | HEART RATE: 110 BPM | TEMPERATURE: 97.5 F | RESPIRATION RATE: 18 BRPM | WEIGHT: 178 LBS

## 2021-11-23 DIAGNOSIS — Z09 S/P EXCISION OF SKIN LESION, FOLLOW-UP EXAM: Primary | ICD-10-CM

## 2021-11-23 DIAGNOSIS — L91.0 HYPERTROPHIC SCAR OF SKIN: ICD-10-CM

## 2021-11-23 PROCEDURE — 99024 POSTOP FOLLOW-UP VISIT: CPT | Performed by: SURGERY

## 2021-11-23 ASSESSMENT — PAIN SCALES - GENERAL: PAINLEVEL: NO PAIN (0)

## 2021-11-23 NOTE — LETTER
11/23/2021         RE: Kenneth Swift  [address]  [facility] [address]        Dear Colleague,    Thank you for referring your patient, Kenneth Swift, to the [facility]. Please see a copy of my visit note below.      Assessment & Plan   Problem List Items Addressed This Visit     None      Visit Diagnoses     S/P excision of skin lesion, follow-up exam    -  Primary    Hypertrophic scar of skin             Patient is doing well after skin tag removal. Noticeable new tissue growth in the same area. Likely hypertrophic scar vs foreign body reaction to suture. Continue to monitor. Currently 3 weeks s/p procedure and area is still in healing process. Would like to see if the area changes in size in the upcoming weeks. If it reduces in size and is bothersome, will consider another procedure for removal. Discussed that this may be part of the patient's healing process. Possibly a reaction to the Vicryl suture used, will try chromic suture in future procedures. If it continues to increase in size, will refer to dermatology for management. Patient is agreeable.         No follow-ups on file.    Patient was seen and examined by myself and Dr. Zamarripa.  The note was then scribed by me.     Aida Dawson, MS3    This patient was seen and examined by myself as well as the medical student.  The medical student scribed the note and I have reviewed it, edited it appropriately, and agree with the final documentation.    Jennifer Zamarripa MD  [facility]    Brian Cooper is a 51 year old who presents for the following health issues:    Kenneth is 3 weeks s/p excision of skin lesion on perianal area. Pain has resolved and no blood in stools. Since the procedure she has noticed a new lesion in the area, unsure if it is a new hemorrhoid. Patient is eating fiber in diet, as well as taking Miralax and stool softener. Has not been constipated or straining during bowel movements.         Review of Systems       Objective    /78   Pulse 110   Temp 97.5  F (36.4  C) (Temporal)   Resp 18   Wt 80.7 kg (178 lb)   SpO2 98%   BMI 27.31 kg/m    Body mass index is 27.31 kg/m .  Physical Exam  HENT:      Head: Normocephalic.   Eyes:      Conjunctiva/sclera: Conjunctivae normal.   Pulmonary:      Effort: Pulmonary effort is normal.   Genitourinary:      Musculoskeletal:         General: Normal range of motion.   Skin:     General: Skin is warm.   Neurological:      General: No focal deficit present.      Mental Status: She is alert.                    Again, thank you for allowing me to participate in the care of your patient.        Sincerely,        Jennifer Zamarripa MD

## 2021-11-23 NOTE — PROGRESS NOTES
Assessment & Plan   Problem List Items Addressed This Visit     None      Visit Diagnoses     S/P excision of skin lesion, follow-up exam    -  Primary    Hypertrophic scar of skin             Patient is doing well after skin tag removal. Noticeable new tissue growth in the same area. Likely hypertrophic scar vs foreign body reaction to suture. Continue to monitor. Currently 3 weeks s/p procedure and area is still in healing process. Would like to see if the area changes in size in the upcoming weeks. If it reduces in size and is bothersome, will consider another procedure for removal. Discussed that this may be part of the patient's healing process. Possibly a reaction to the Vicryl suture used, will try chromic suture in future procedures. If it continues to increase in size, will refer to dermatology for management. Patient is agreeable.         No follow-ups on file.    Patient was seen and examined by myself and Dr. Zamarripa.  The note was then scribed by me.     Aida Dawson, MS3    This patient was seen and examined by myself as well as the medical student.  The medical student scribed the note and I have reviewed it, edited it appropriately, and agree with the final documentation.    Jennifer Zamarripa MD  [facility]    Brian Cooper is a 51 year old who presents for the following health issues:    Kenneth is 3 weeks s/p excision of skin lesion on perianal area. Pain has resolved and no blood in stools. Since the procedure she has noticed a new lesion in the area, unsure if it is a new hemorrhoid. Patient is eating fiber in diet, as well as taking Miralax and stool softener. Has not been constipated or straining during bowel movements.        Review of Systems       Objective    /78   Pulse 110   Temp 97.5  F (36.4  C) (Temporal)   Resp 18   Wt 80.7 kg (178 lb)   SpO2 98%   BMI 27.31 kg/m    Body mass index is 27.31 kg/m .  Physical Exam  HENT:      Head: Normocephalic.   Eyes:       Conjunctiva/sclera: Conjunctivae normal.   Pulmonary:      Effort: Pulmonary effort is normal.   Genitourinary:      Musculoskeletal:         General: Normal range of motion.   Skin:     General: Skin is warm.   Neurological:      General: No focal deficit present.      Mental Status: She is alert.

## 2022-05-08 ENCOUNTER — VIRTUAL VISIT (OUTPATIENT)
Dept: URGENT CARE | Facility: CLINIC | Age: 52
End: 2022-05-08
Payer: COMMERCIAL

## 2022-05-08 DIAGNOSIS — U07.1 SARS-COV-2 POSITIVE: Primary | ICD-10-CM

## 2022-05-08 PROCEDURE — 99214 OFFICE O/P EST MOD 30 MIN: CPT | Mod: 95

## 2022-05-08 NOTE — PROGRESS NOTES
"Assessment:    SARS-CoV-2 positive     COVID-19 positive patient.  Encounter for consideration of medication intervention.    Patient does qualify for a prescription.   Full discussion with patient including medication options, risks and benefits.   Potential drug interactions reviewed with patient.      Plan:  Treatment planned   Paxlovid, Rx sent to [place] [facility]  [facility]   [phone]    [address] [place] [address]    Get Directions  Hours:  Mon-Fri: 8:00a - 8:30p  Sat: 9:00a - 5:00p  Sun: 9:00a - 4:00p    Temporary change to home medications:   Told to HOLD Trazodone or decrease dose in half    See patient instructions    Patient preference to obtain AVS:  Maria Fernanda Cooper  is a 51 year old  who has a confirmed new positive COVID-19 diagnosis.      She has been identified as high risk for complications of this infection, and is being evaluated via a billable     Phone visit.      Phone call duration: 8 minutes    Concern for COVID-19  Exactly how many days ago did these symptoms start? 4 days ago    Are you vaccinated? Yes    Positive COVID test? Yes     Are any of the following symptoms significant for you?    New or worsening difficulty breathing? No    Worsening cough? Yes, it's a productive cough.     Fever or chills? Yes, I felt feverish or had chills.    Headache: YES    Sore throat: YES    Chest pain: no    Diarrhea: no    Body aches? no    What treatments has patient tried? Nonsteroidals   Does patient live in a nursing home, group home, or shelter? no  Does patient have a way to get food/medications during quarantined? Yes, I have a friend or family member who can help me.         No flowsheet data found.              Objective    Vitals:  No vitals were obtained today due to virtual visit.  Estimated body mass index is 27.31 kg/m  as calculated from the following:    Height as of 11/3/21: 1.72 m (5' 7.7\").    Weight as of 11/23/21: 80.7 kg (178 lb). " "  General: Alert, no apparent distress  PSYCH: Alert; coherent speech, normal rate and volume, able to articulate logical thoughts, appropriate insight, no tangential thoughts, no hallucination or delusions.  Affect is appropriate  RESP: No cough, no audible wheezing, able to talk in full sentences  Remainder of exam unable to be completed due to telephone visit  GFR Estimate   Date Value Ref Range Status   09/27/2021 76 >60 mL/min/1.73m2 Final     Comment:     As of July 11, 2021, eGFR is calculated by the CKD-EPI creatinine equation, without race adjustment. eGFR can be influenced by muscle mass, exercise, and diet. The reported eGFR is an estimation only and is only applicable if the renal function is stable.   09/22/2020 >90 >60 mL/min/[1.73_m2] Final     Comment:     Non  GFR Calc  Starting 12/18/2018, serum creatinine based estimated GFR (eGFR) will be   calculated using the Chronic Kidney Disease Epidemiology Collaboration   (CKD-EPI) equation.                  Virtual Urgent Care    The patient has been notified of following:     \"This telephone visit will be conducted via a call between you and your physician/provider. We have found that certain health care needs can be provided without the need for a physical exam.  This service lets us provide the care you need with a short phone conversation.  If a prescription is necessary we can send it directly to your pharmacy.  If lab work is needed we can place an order for that and you can then stop by our lab to have the test done at a later time.    Telephone visits are billed at different rates depending on your insurance coverage. During this emergency period, for some insurers they may be billed the same as an in-person visit.  Please reach out to your insurance provider with any questions.    If during the course of the call the physician/provider feels a telephone visit is not appropriate, you will not be charged for this " "service.\"    Patient has given verbal consent for Telephone visit?  Yes    "

## 2022-05-08 NOTE — PATIENT INSTRUCTIONS
[facility]   [phone]    [address] [place] [address]    Get Directions  Hours:  Mon-Fri: 8:00a - 8:30p  Sat: 9:00a - 5:00p  Sun: 9:00a - 4:00p

## 2022-07-21 ENCOUNTER — OFFICE VISIT (OUTPATIENT)
Dept: FAMILY MEDICINE | Facility: CLINIC | Age: 52
End: 2022-07-21
Payer: COMMERCIAL

## 2022-07-21 VITALS
TEMPERATURE: 97.2 F | DIASTOLIC BLOOD PRESSURE: 72 MMHG | OXYGEN SATURATION: 100 % | BODY MASS INDEX: 24.25 KG/M2 | HEIGHT: 68 IN | WEIGHT: 160 LBS | HEART RATE: 90 BPM | SYSTOLIC BLOOD PRESSURE: 120 MMHG

## 2022-07-21 DIAGNOSIS — Z86.39 HISTORY OF NON ANEMIC VITAMIN B12 DEFICIENCY: ICD-10-CM

## 2022-07-21 DIAGNOSIS — N93.9 ABNORMAL UTERINE BLEEDING: ICD-10-CM

## 2022-07-21 DIAGNOSIS — Z86.39 HISTORY OF VITAMIN D DEFICIENCY: ICD-10-CM

## 2022-07-21 DIAGNOSIS — D50.0 IRON DEFICIENCY ANEMIA DUE TO CHRONIC BLOOD LOSS: ICD-10-CM

## 2022-07-21 DIAGNOSIS — E06.3 HASHIMOTO'S THYROIDITIS: ICD-10-CM

## 2022-07-21 DIAGNOSIS — Z01.818 PREOP GENERAL PHYSICAL EXAM: Primary | ICD-10-CM

## 2022-07-21 LAB
DEPRECATED CALCIDIOL+CALCIFEROL SERPL-MC: 39 UG/L (ref 20–75)
HGB BLD-MCNC: 10 G/DL (ref 11.7–15.7)
VIT B12 SERPL-MCNC: 379 PG/ML (ref 232–1245)

## 2022-07-21 PROCEDURE — 82306 VITAMIN D 25 HYDROXY: CPT | Performed by: STUDENT IN AN ORGANIZED HEALTH CARE EDUCATION/TRAINING PROGRAM

## 2022-07-21 PROCEDURE — 99213 OFFICE O/P EST LOW 20 MIN: CPT | Performed by: STUDENT IN AN ORGANIZED HEALTH CARE EDUCATION/TRAINING PROGRAM

## 2022-07-21 PROCEDURE — 85018 HEMOGLOBIN: CPT | Performed by: STUDENT IN AN ORGANIZED HEALTH CARE EDUCATION/TRAINING PROGRAM

## 2022-07-21 PROCEDURE — 36415 COLL VENOUS BLD VENIPUNCTURE: CPT | Performed by: STUDENT IN AN ORGANIZED HEALTH CARE EDUCATION/TRAINING PROGRAM

## 2022-07-21 PROCEDURE — 82607 VITAMIN B-12: CPT | Performed by: STUDENT IN AN ORGANIZED HEALTH CARE EDUCATION/TRAINING PROGRAM

## 2022-07-21 ASSESSMENT — PAIN SCALES - GENERAL: PAINLEVEL: NO PAIN (0)

## 2022-07-21 NOTE — PROGRESS NOTES
[address] [address]  Phone: [phone]  Fax: [phone]  Primary Provider: No Ref-Primary, Physician      PREOPERATIVE EVALUATION:  Today's date: 7/21/2022    Kenneth Swift is a 51 year old female who presents for a preoperative evaluation.    Surgical Information:  Surgery/Procedure: OPERATIVE HYSTEROSCOPY WITH POLYPECTOMY USING MYOSURE, WITH NOVASURE ABLATION  Surgery Location: [facility]  Surgeon: Dr. Post  Surgery Date: 07/25/2022  Time of Surgery: 8:00am  Where patient plans to recover: At home with family  Fax number for surgical facility: [phone]    Type of Anesthesia Anticipated: General    Assessment & Plan     The proposed surgical procedure is considered INTERMEDIATE risk.    Preop general physical exam  Plan for polypectomy with ablation    Abnormal uterine bleeding  - Hemoglobin  - Hemoglobin    Iron deficiency anemia due to chronic blood loss  Continues on iron supplement.  We will repeat hemoglobin today prior to procedure.  - Hemoglobin  - Hemoglobin    Hashimoto's thyroiditis  Recent stable TSH and has been for the last year since discontinuing thyroid supplementation.  Reports feeling better since discontinuation.    History of non anemic vitamin B12 deficiency  - Vitamin B12  - Vitamin B12    History of vitamin D deficiency  - Vitamin D Deficiency  - Vitamin D Deficiency           Risks and Recommendations:  The patient has the following additional risks and recommendations for perioperative complications:   - No identified additional risk factors other than previously addressed    Medication Instructions:  Patient is to take all scheduled medications on the day of surgery    RECOMMENDATION:  APPROVAL GIVEN to proceed with proposed procedure, without further diagnostic evaluation.      Subjective     HPI related to upcoming procedure: Continuous uterine bleeding for the last 6 months. More consistent the last 2  months.  Unable to view outside ultrasound which did show a polyp.  Now planning for polypectomy as well as ablation.    Preop Questions 7/20/2022   1. Have you ever had a heart attack or stroke? No   2. Have you ever had surgery on your heart or blood vessels, such as a stent placement, a coronary artery bypass, or surgery on an artery in your head, neck, heart, or legs? No   3. Do you have chest pain with activity? No   4. Do you have a history of  heart failure? No   5. Do you currently have a cold, bronchitis or symptoms of other infection? No   6. Do you have a cough, shortness of breath, or wheezing? No   7. Do you or anyone in your family have previous history of blood clots? No   8. Do you or does anyone in your family have a serious bleeding problem such as prolonged bleeding following surgeries or cuts? No   9. Have you ever had problems with anemia or been told to take iron pills? YES -currently.    10. Have you had any abnormal blood loss such as black, tarry or bloody stools, or abnormal vaginal bleeding? YES - vaginal   11. Have you ever had a blood transfusion? No   12. Are you willing to have a blood transfusion if it is medically needed before, during, or after your surgery? Yes   13. Have you or any of your relatives ever had problems with anesthesia? No   14. Do you have sleep apnea, excessive snoring or daytime drowsiness? No   15. Do you have any artifical heart valves or other implanted medical devices like a pacemaker, defibrillator, or continuous glucose monitor? No   16. Do you have artificial joints? No   17. Are you allergic to latex? No   18. Is there any chance that you may be pregnant? No       Health Care Directive:  Patient does not have a Health Care Directive or Living Will: Discussed advance care planning with patient; information given to patient to review.    Preoperative Review of :   reviewed - no record of controlled substances prescribed.        Review of  Systems  Constitutional, neuro, ENT, endocrine, pulmonary, cardiac, gastrointestinal, genitourinary, musculoskeletal, integument and psychiatric systems are negative, except as otherwise noted.    Patient Active Problem List    Diagnosis Date Noted     Other specified intestinal malabsorption 09/27/2021     Priority: Medium     Formatting of this note might be different from the original.  Created by Conversion       Repetitive intrusions of sleep      Priority: Medium     sleep study       Insomnia      Priority: Medium     sleep study  Problem list name updated by automated process. Provider to review       Hypersomnia      Priority: Medium     sleep study  Problem list name updated by automated process. Provider to review       Persistent disorder of initiating or maintaining sleep      Priority: Medium     sleep study       Right leg paresthesias 12/17/2014     Priority: Medium     Low back pain 03/05/2014     Priority: Medium     Diagnosis updated by automated process. Provider to review and confirm.       History of vitamin D deficiency 07/27/2013     Priority: Medium     History of non anemic vitamin B12 deficiency 07/27/2013     Priority: Medium     Gastric bypass       Urinary frequency 04/11/2013     Priority: Medium     Sleep disturbance 04/11/2013     Priority: Medium     Vegetarianism 12/04/2012     Priority: Medium     Hashimoto's thyroiditis 08/01/2011     Priority: Medium     Higher risk of developing hypothyroidism  (Problem list name updated by automated process. Provider to review and confirm.)       Multinodular goiter 08/01/2011     Priority: Medium     CARDIOVASCULAR SCREENING; LDL GOAL LESS THAN 160 06/21/2011     Priority: Medium     Immunization not carried out because of patient refusal 06/21/2011     Priority: Medium     tdap       Thyroid enlargement 06/21/2011     Priority: Medium     Overweight 02/15/2008     Priority: Medium      Past Medical History:   Diagnosis Date     Hashimoto's  "disease      Hypersomnia, unspecified 2015    sleep study     Immunization not carried out because of patient refusal 2011    tdap     Insomnia, unspecified 2015    sleep study     Persistent disorder of initiating or maintaining sleep 2015    sleep study     Repetitive intrusions of sleep 2015    sleep study     Shingles 2008    hand - resolved without complications     Uncomplicated asthma 1978    during childhood     Vitamin B12 deficiency 2012     Vitamin D deficiency      Past Surgical History:   Procedure Laterality Date     BIOPSY  2016    thyroid     COLONOSCOPY N/A 10/07/2021    Procedure: COLONOSCOPY, WITH POLYPECTOMY;  Surgeon: Jennifer Zamarripa MD;  Location: [facility]     RECONSTRUCT BREAST, IMPLANT PROSTHESIS, COMBINED       WISDOM ST GUIDEWIRE       ZZC ENLARGE BREAST      Description: Breast Surgery Enlargement Procedure;  Recorded: 2014;     Current Outpatient Medications   Medication Sig Dispense Refill     melatonin 3 MG tablet Take 1 tablet (3 mg) by mouth nightly as needed for sleep       Multiple Vitamin (MULTI-VITAMIN PO) Take  by mouth daily.       NONFORMULARY Take by mouth daily \"Iron supplement\"         No Known Allergies     Social History     Tobacco Use     Smoking status: Former Smoker     Packs/day: 0.50     Years: 2.00     Pack years: 1.00     Start date: 1987     Quit date: 10/28/1989     Years since quittin.7     Smokeless tobacco: Never Used   Substance Use Topics     Alcohol use: Yes     Comment: red wine - few nights weekly     Family History   Problem Relation Age of Onset     Diabetes Mother         30's insulin dependent     Circulatory Mother         heart valve replacement     Thyroid Disease Sister         Hashimoto's     Other - See Comments Sister         Insomnia     Cerebrovascular Disease No family hx of      Hypertension No family hx of      History   Drug Use No         Objective     /72   Pulse 90   Temp " "97.2  F (36.2  C) (Temporal)   Ht 1.715 m (5' 7.5\")   Wt 72.6 kg (160 lb)   SpO2 100%   BMI 24.69 kg/m      Physical Exam    GENERAL APPEARANCE: healthy, alert and no distress     EYES: EOMI, PERRL     HENT: ear canals and TM's normal and nose and mouth without ulcers or lesions     NECK: no adenopathy, no asymmetry, masses, or scars and thyroid normal to palpation     RESP: lungs clear to auscultation - no rales, rhonchi or wheezes     CV: regular rates and rhythm, normal S1 S2, no S3 or S4 and no murmur, click or rub     ABDOMEN:  soft, nontender, no HSM or masses and bowel sounds normal     MS: extremities normal- no gross deformities noted, no evidence of inflammation in joints, FROM in all extremities.     SKIN: no suspicious lesions or rashes     NEURO: Normal strength and tone, sensory exam grossly normal, mentation intact and speech normal     PSYCH: mentation appears normal. and affect normal/bright     LYMPHATICS: No cervical adenopathy    Recent Labs   Lab Test 09/27/21  0745 09/22/20  1223   HGB 11.4* 12.0    283    139   POTASSIUM 3.6 3.9   CR 0.87 0.76        Diagnostics:  Labs pending at this time.  Results will be reviewed when available.   No EKG required, no history of coronary heart disease, significant arrhythmia, peripheral arterial disease or other structural heart disease.    Revised Cardiac Risk Index (RCRI):  The patient has the following serious cardiovascular risks for perioperative complications:   - No serious cardiac risks = 0 points     RCRI Interpretation: 0 points: Class I (very low risk - 0.4% complication rate)           Signed Electronically by: Harris Matias MD  Copy of this evaluation report is provided to requesting physician.        "

## 2022-10-16 ENCOUNTER — HEALTH MAINTENANCE LETTER (OUTPATIENT)
Age: 52
End: 2022-10-16

## 2022-12-03 ENCOUNTER — HEALTH MAINTENANCE LETTER (OUTPATIENT)
Age: 52
End: 2022-12-03

## 2023-01-25 ASSESSMENT — ENCOUNTER SYMPTOMS
PALPITATIONS: 0
SORE THROAT: 0
HEMATOCHEZIA: 0
CONSTIPATION: 0
HEADACHES: 0
BREAST MASS: 0
CHILLS: 0
HEARTBURN: 0
NERVOUS/ANXIOUS: 0
PARESTHESIAS: 0
MYALGIAS: 0
SHORTNESS OF BREATH: 0
DYSURIA: 0
WEAKNESS: 0
FREQUENCY: 0
DIZZINESS: 0
JOINT SWELLING: 0
DIARRHEA: 0
FEVER: 0
NAUSEA: 0
ARTHRALGIAS: 0
ABDOMINAL PAIN: 0
HEMATURIA: 0
EYE PAIN: 0
COUGH: 0

## 2023-01-26 ENCOUNTER — OFFICE VISIT (OUTPATIENT)
Dept: FAMILY MEDICINE | Facility: CLINIC | Age: 53
End: 2023-01-26
Payer: COMMERCIAL

## 2023-01-26 VITALS
SYSTOLIC BLOOD PRESSURE: 138 MMHG | HEART RATE: 77 BPM | DIASTOLIC BLOOD PRESSURE: 78 MMHG | BODY MASS INDEX: 26.56 KG/M2 | WEIGHT: 175.25 LBS | TEMPERATURE: 98.1 F | OXYGEN SATURATION: 100 % | HEIGHT: 68 IN

## 2023-01-26 DIAGNOSIS — Z13.220 LIPID SCREENING: ICD-10-CM

## 2023-01-26 DIAGNOSIS — E61.1 LOW IRON: ICD-10-CM

## 2023-01-26 DIAGNOSIS — E06.3 HASHIMOTO'S THYROIDITIS: ICD-10-CM

## 2023-01-26 DIAGNOSIS — Z23 NEED FOR SHINGLES VACCINE: ICD-10-CM

## 2023-01-26 DIAGNOSIS — Z12.31 VISIT FOR SCREENING MAMMOGRAM: ICD-10-CM

## 2023-01-26 DIAGNOSIS — Z00.00 ROUTINE HISTORY AND PHYSICAL EXAMINATION OF ADULT: Primary | ICD-10-CM

## 2023-01-26 DIAGNOSIS — Z23 HIGH PRIORITY FOR 2019-NCOV VACCINE: ICD-10-CM

## 2023-01-26 LAB
CHOLEST SERPL-MCNC: 238 MG/DL
HDLC SERPL-MCNC: 103 MG/DL
IRON BINDING CAPACITY (ROCHE): 351 UG/DL (ref 240–430)
IRON SATN MFR SERPL: 25 % (ref 15–46)
IRON SERPL-MCNC: 88 UG/DL (ref 37–145)
LDLC SERPL CALC-MCNC: 123 MG/DL
NONHDLC SERPL-MCNC: 135 MG/DL
TRIGL SERPL-MCNC: 62 MG/DL
TSH SERPL DL<=0.005 MIU/L-ACNC: 1.28 UIU/ML (ref 0.3–4.2)

## 2023-01-26 PROCEDURE — 99213 OFFICE O/P EST LOW 20 MIN: CPT | Mod: 25 | Performed by: PHYSICIAN ASSISTANT

## 2023-01-26 PROCEDURE — 83540 ASSAY OF IRON: CPT | Performed by: PHYSICIAN ASSISTANT

## 2023-01-26 PROCEDURE — 36415 COLL VENOUS BLD VENIPUNCTURE: CPT | Performed by: PHYSICIAN ASSISTANT

## 2023-01-26 PROCEDURE — 83550 IRON BINDING TEST: CPT | Performed by: PHYSICIAN ASSISTANT

## 2023-01-26 PROCEDURE — 84443 ASSAY THYROID STIM HORMONE: CPT | Performed by: PHYSICIAN ASSISTANT

## 2023-01-26 PROCEDURE — 0134A COVID-19 VACCINE BIVALENT BOOSTER 18+ (MODERNA): CPT | Performed by: PHYSICIAN ASSISTANT

## 2023-01-26 PROCEDURE — 90750 HZV VACC RECOMBINANT IM: CPT | Performed by: PHYSICIAN ASSISTANT

## 2023-01-26 PROCEDURE — 90471 IMMUNIZATION ADMIN: CPT | Performed by: PHYSICIAN ASSISTANT

## 2023-01-26 PROCEDURE — 91313 COVID-19 VACCINE BIVALENT BOOSTER 18+ (MODERNA): CPT | Performed by: PHYSICIAN ASSISTANT

## 2023-01-26 PROCEDURE — 80061 LIPID PANEL: CPT | Performed by: PHYSICIAN ASSISTANT

## 2023-01-26 PROCEDURE — 99396 PREV VISIT EST AGE 40-64: CPT | Mod: 25 | Performed by: PHYSICIAN ASSISTANT

## 2023-01-26 ASSESSMENT — ENCOUNTER SYMPTOMS
CHILLS: 0
DIZZINESS: 0
DYSURIA: 0
HEADACHES: 0
DIARRHEA: 0
SORE THROAT: 0
ABDOMINAL PAIN: 0
FEVER: 0
NERVOUS/ANXIOUS: 0
SHORTNESS OF BREATH: 0
JOINT SWELLING: 0
COUGH: 0
HEARTBURN: 0
CONSTIPATION: 0
BREAST MASS: 0
WEAKNESS: 0
FREQUENCY: 0
ARTHRALGIAS: 0
PARESTHESIAS: 0
HEMATURIA: 0
EYE PAIN: 0
MYALGIAS: 0
NAUSEA: 0
PALPITATIONS: 0
HEMATOCHEZIA: 0

## 2023-01-26 NOTE — PROGRESS NOTES
SUBJECTIVE:   CC: Kenneth is an 52 year old who presents for preventive health visit.     Patient has been advised of split billing requirements and indicates understanding: Yes  Healthy Habits:     Getting at least 3 servings of Calcium per day:  Yes    Bi-annual eye exam:  Yes    Dental care twice a year:  Yes    Sleep apnea or symptoms of sleep apnea:  Daytime drowsiness    Diet:  Vegetarian/vegan    Frequency of exercise:  4-5 days/week    Duration of exercise:  15-30 minutes    Taking medications regularly:  Yes    Medication side effects:  Not applicable    PHQ-2 Total Score: 0    Additional concerns today:  No    History of Hashimoto's - was on supplementation of Levothyroxine for this but never felt any better so tapered off. She does note some fullness of her thyroid. Last ultrasound was 4 years ago.     Previously iron was low. Did have ablation in July and has not had any bleeding since. Will recheck levels.     Today's PHQ-2 Score:   PHQ-2 (  [employer]) 2023   Q1: Little interest or pleasure in doing things 0   Q2: Feeling down, depressed or hopeless 0   PHQ-2 Score 0   PHQ-2 Total Score (12-17 Years)- Positive if 3 or more points; Administer PHQ-A if positive -   Q1: Little interest or pleasure in doing things Not at all   Q2: Feeling down, depressed or hopeless Not at all   PHQ-2 Score 0       Have you ever done Advance Care Planning? (For example, a Health Directive, POLST, or a discussion with a medical provider or your loved ones about your wishes): Yes, patient states has an Advance Care Planning document and will bring a copy to the clinic.    Social History     Tobacco Use     Smoking status: Former     Packs/day: 0.50     Years: 2.00     Pack years: 1.00     Types: Cigarettes     Start date: 1987     Quit date: 10/28/1989     Years since quittin.2     Smokeless tobacco: Never   Substance Use Topics     Alcohol use: Yes     Comment: red wine - few nights weekly     If you drink  alcohol do you typically have >3 drinks per day or >7 drinks per week? No    Alcohol Use 2023   Prescreen: >3 drinks/day or >7 drinks/week? Yes   Prescreen: >3 drinks/day or >7 drinks/week? -   AUDIT SCORE  4       Reviewed orders with patient.  Reviewed health maintenance and updated orders accordingly - Yes  BP Readings from Last 3 Encounters:   23 138/78   22 120/72   21 116/78    Wt Readings from Last 3 Encounters:   23 79.5 kg (175 lb 4 oz)   22 72.6 kg (160 lb)   21 80.7 kg (178 lb)                  Patient Active Problem List   Diagnosis     CARDIOVASCULAR SCREENING; LDL GOAL LESS THAN 160     Immunization not carried out because of patient refusal     Thyroid enlargement     Hashimoto's thyroiditis     Multinodular goiter     Vegetarianism     Urinary frequency     Sleep disturbance     History of vitamin D deficiency     History of non anemic vitamin B12 deficiency     Low back pain     Right leg paresthesias     Repetitive intrusions of sleep     Insomnia     Hypersomnia     Persistent disorder of initiating or maintaining sleep     Other specified intestinal malabsorption     Overweight     Past Surgical History:   Procedure Laterality Date     BIOPSY  2016    thyroid     BREAST EXCISIONAL BIOPSY Left      COLONOSCOPY N/A 10/07/2021    Procedure: COLONOSCOPY, WITH POLYPECTOMY;  Surgeon: Jennifer Zamarripa MD;  Location:  GI     HYSTEROSCOPY       RECONSTRUCT BREAST, IMPLANT PROSTHESIS, COMBINED       WISDOM ST GUIDEWIRE       ZZC ENLARGE BREAST      Description: Breast Surgery Enlargement Procedure;  Recorded: 2014;       Social History     Tobacco Use     Smoking status: Former     Packs/day: 0.50     Years: 2.00     Pack years: 1.00     Types: Cigarettes     Start date: 1987     Quit date: 10/28/1989     Years since quittin.2     Smokeless tobacco: Never   Substance Use Topics     Alcohol use: Yes     Comment: red wine - few nights weekly      "Family History   Problem Relation Age of Onset     Diabetes Mother         30's insulin dependent     Circulatory Mother         heart valve replacement     Thyroid Disease Sister         Hashimoto's     Other - See Comments Sister         Insomnia     Pituitary Disorder Paternal Grandmother      Cerebrovascular Disease No family hx of      Hypertension No family hx of          Current Outpatient Medications   Medication Sig Dispense Refill     melatonin 3 MG tablet Take 1 tablet (3 mg) by mouth nightly as needed for sleep       Multiple Vitamin (MULTI-VITAMIN PO) Take  by mouth daily.       NONFORMULARY Take by mouth daily \"Iron supplement\"         Breast Cancer Screening:    Breast CA Risk Assessment (FHS-7) 9/22/2021 10/19/2021 11/2/2021   Do you have a family history of breast, colon, or ovarian cancer? No / Unknown No / Unknown No / Unknown       Mammogram Screening: Recommended annual mammography  Pertinent mammograms are reviewed under the imaging tab.    History of abnormal Pap smear: NO - age 30-65 PAP every 5 years with negative HPV co-testing recommended  PAP / HPV Latest Ref Rng & Units 3/20/2019 12/17/2014 6/21/2011   PAP (Historical) - NIL NIL NIL   HPV16 NEG:Negative Negative - -   HPV18 NEG:Negative Negative - -   HRHPV NEG:Negative Negative - -     Reviewed and updated as needed this visit by clinical staff   Tobacco  Allergies  Meds              Reviewed and updated as needed this visit by Provider                     Review of Systems   Constitutional: Negative for chills and fever.   HENT: Negative for congestion, ear pain, hearing loss and sore throat.    Eyes: Negative for pain and visual disturbance.   Respiratory: Negative for cough and shortness of breath.    Cardiovascular: Negative for chest pain, palpitations and peripheral edema.   Gastrointestinal: Negative for abdominal pain, constipation, diarrhea, heartburn, hematochezia and nausea.   Breasts:  Negative for tenderness, breast mass " "and discharge.   Genitourinary: Negative for dysuria, frequency, genital sores, hematuria, pelvic pain, urgency, vaginal bleeding and vaginal discharge.   Musculoskeletal: Negative for arthralgias, joint swelling and myalgias.   Skin: Negative for rash.   Neurological: Negative for dizziness, weakness, headaches and paresthesias.   Psychiatric/Behavioral: Negative for mood changes. The patient is not nervous/anxious.         OBJECTIVE:   /78   Pulse 77   Temp 98.1  F (36.7  C) (Temporal)   Ht 1.727 m (5' 8\")   Wt 79.5 kg (175 lb 4 oz)   SpO2 100%   BMI 26.65 kg/m    Physical Exam  GENERAL: healthy, alert and no distress  EYES: Eyes grossly normal to inspection, PERRL and conjunctivae and sclerae normal  HENT: ear canals and TM's normal, nose and mouth without ulcers or lesions  NECK: no adenopathy, no asymmetry, masses, or scars and thyroid normal to palpation  RESP: lungs clear to auscultation - no rales, rhonchi or wheezes  CV: regular rate and rhythm, normal S1 S2, no S3 or S4, no murmur, click or rub, no peripheral edema and peripheral pulses strong  ABDOMEN: soft, nontender, no hepatosplenomegaly, no masses and bowel sounds normal  MS: no gross musculoskeletal defects noted, no edema  SKIN: no suspicious lesions or rashes  NEURO: Normal strength and tone, mentation intact and speech normal  PSYCH: mentation appears normal, affect normal/bright    Diagnostic Test Results:  Labs reviewed in Epic    ASSESSMENT/PLAN:   (Z00.00) Routine history and physical examination of adult  (primary encounter diagnosis)    (E06.3) Hashimoto's thyroiditis  Comment: Will recheck labs today as well as orders placed for thyroid ultrasound.   Plan: TSH WITH FREE T4 REFLEX, US Thyroid    (Z12.31) Visit for screening mammogram  Plan: MA SCREENING DIGITAL BILAT - Future  (s+30)    (E61.1) Low iron  Plan: Iron and iron binding capacity    (Z23) High priority for 2019-nCoV vaccine  Plan: COVID-19,PF,MODERNA BIVALENT " "18+Yrs    (Z23) Need for shingles vaccine  Plan: ZOSTER VACCINE RECOMBINANT ADJUVANTED IM NJX      Patient has been advised of split billing requirements and indicates understanding: Yes      COUNSELING:  Reviewed preventive health counseling, as reflected in patient instructions      BMI:   Estimated body mass index is 26.65 kg/m  as calculated from the following:    Height as of this encounter: 1.727 m (5' 8\").    Weight as of this encounter: 79.5 kg (175 lb 4 oz).   Weight management plan: Discussed healthy diet and exercise guidelines      She reports that she quit smoking about 33 years ago. Her smoking use included cigarettes. She started smoking about 35 years ago. She has a 1.00 pack-year smoking history. She has never used smokeless tobacco.    Desi Mojiac PA-C  [facility]  "

## 2023-02-08 ENCOUNTER — HOSPITAL ENCOUNTER (OUTPATIENT)
Dept: ULTRASOUND IMAGING | Facility: CLINIC | Age: 53
Discharge: HOME | End: 2023-02-08
Attending: PHYSICIAN ASSISTANT | Admitting: PHYSICIAN ASSISTANT
Payer: COMMERCIAL

## 2023-02-08 ENCOUNTER — TELEPHONE (OUTPATIENT)
Dept: FAMILY MEDICINE | Facility: CLINIC | Age: 53
End: 2023-02-08
Payer: COMMERCIAL

## 2023-02-08 DIAGNOSIS — E04.2 MULTINODULAR THYROID: Primary | ICD-10-CM

## 2023-02-08 DIAGNOSIS — E06.3 HASHIMOTO'S THYROIDITIS: ICD-10-CM

## 2023-02-08 PROCEDURE — 76536 US EXAM OF HEAD AND NECK: CPT

## 2023-02-09 NOTE — TELEPHONE ENCOUNTER
Patient returned call and informed of her results as noted by primary below. Patient stating she already received a call from radiology and is scheduled for the biopsy. Caron Fry LPN

## 2023-02-09 NOTE — TELEPHONE ENCOUNTER
Please let patient know that her thyroid ultrasound continues to show many nodules. One of these has grown in size so biopsy is recommended. Orders have been placed for this. Please assist in scheduling.     Desi Mojica PA-C

## 2023-02-22 ENCOUNTER — HOSPITAL ENCOUNTER (OUTPATIENT)
Dept: ULTRASOUND IMAGING | Facility: CLINIC | Age: 53
Discharge: HOME | End: 2023-02-22
Attending: PHYSICIAN ASSISTANT | Admitting: PHYSICIAN ASSISTANT
Payer: COMMERCIAL

## 2023-02-22 DIAGNOSIS — E04.2 MULTINODULAR THYROID: ICD-10-CM

## 2023-02-22 PROCEDURE — 10005 FNA BX W/US GDN 1ST LES: CPT

## 2023-02-22 PROCEDURE — 88173 CYTOPATH EVAL FNA REPORT: CPT | Mod: 26 | Performed by: PATHOLOGY

## 2023-02-22 PROCEDURE — 250N000009 HC RX 250: Performed by: RADIOLOGY

## 2023-02-22 PROCEDURE — 88173 CYTOPATH EVAL FNA REPORT: CPT | Mod: TC | Performed by: PHYSICIAN ASSISTANT

## 2023-02-22 RX ORDER — LIDOCAINE HYDROCHLORIDE 10 MG/ML
10 INJECTION, SOLUTION EPIDURAL; INFILTRATION; INTRACAUDAL; PERINEURAL ONCE
Status: COMPLETED | OUTPATIENT
Start: 2023-02-22 | End: 2023-02-22

## 2023-02-22 RX ADMIN — LIDOCAINE HYDROCHLORIDE 1 ML: 10 INJECTION, SOLUTION EPIDURAL; INFILTRATION; INTRACAUDAL; PERINEURAL at 07:53

## 2023-02-24 LAB
PATH REPORT.COMMENTS IMP SPEC: NORMAL
PATH REPORT.COMMENTS IMP SPEC: NORMAL
PATH REPORT.FINAL DX SPEC: NORMAL
PATH REPORT.GROSS SPEC: NORMAL
PATH REPORT.MICROSCOPIC SPEC OTHER STN: NORMAL
PATH REPORT.RELEVANT HX SPEC: NORMAL

## 2023-03-26 ENCOUNTER — HEALTH MAINTENANCE LETTER (OUTPATIENT)
Age: 53
End: 2023-03-26

## 2023-04-06 ENCOUNTER — OFFICE VISIT (OUTPATIENT)
Dept: FAMILY MEDICINE | Facility: CLINIC | Age: 53
End: 2023-04-06
Payer: COMMERCIAL

## 2023-04-06 VITALS
DIASTOLIC BLOOD PRESSURE: 80 MMHG | HEART RATE: 90 BPM | WEIGHT: 174.25 LBS | OXYGEN SATURATION: 99 % | SYSTOLIC BLOOD PRESSURE: 136 MMHG | BODY MASS INDEX: 26.41 KG/M2 | TEMPERATURE: 97.2 F | HEIGHT: 68 IN

## 2023-04-06 DIAGNOSIS — R20.2 PARESTHESIA: ICD-10-CM

## 2023-04-06 DIAGNOSIS — R53.83 OTHER FATIGUE: ICD-10-CM

## 2023-04-06 DIAGNOSIS — R73.01 ELEVATED FASTING GLUCOSE: ICD-10-CM

## 2023-04-06 DIAGNOSIS — E06.3 HASHIMOTO'S THYROIDITIS: Primary | ICD-10-CM

## 2023-04-06 DIAGNOSIS — E04.2 MULTINODULAR GOITER: ICD-10-CM

## 2023-04-06 LAB
ALBUMIN SERPL BCG-MCNC: 4.4 G/DL (ref 3.5–5.2)
ALP SERPL-CCNC: 69 U/L (ref 35–104)
ALT SERPL W P-5'-P-CCNC: 17 U/L (ref 10–35)
ANION GAP SERPL CALCULATED.3IONS-SCNC: 10 MMOL/L (ref 7–15)
AST SERPL W P-5'-P-CCNC: 24 U/L (ref 10–35)
BILIRUB SERPL-MCNC: 0.4 MG/DL
BUN SERPL-MCNC: 9.4 MG/DL (ref 6–20)
CALCIUM SERPL-MCNC: 9.3 MG/DL (ref 8.6–10)
CHLORIDE SERPL-SCNC: 103 MMOL/L (ref 98–107)
CREAT SERPL-MCNC: 0.81 MG/DL (ref 0.51–0.95)
DEPRECATED HCO3 PLAS-SCNC: 27 MMOL/L (ref 22–29)
GFR SERPL CREATININE-BSD FRML MDRD: 86 ML/MIN/1.73M2
GLUCOSE SERPL-MCNC: 102 MG/DL (ref 70–99)
HBA1C MFR BLD: 5.2 %
POTASSIUM SERPL-SCNC: 3.7 MMOL/L (ref 3.4–5.3)
PROT SERPL-MCNC: 6.8 G/DL (ref 6.4–8.3)
SODIUM SERPL-SCNC: 140 MMOL/L (ref 136–145)
T3FREE SERPL-MCNC: 3 PG/ML (ref 2–4.4)
T4 FREE SERPL-MCNC: 1.2 NG/DL (ref 0.9–1.7)
TSH SERPL DL<=0.005 MIU/L-ACNC: 0.95 UIU/ML (ref 0.3–4.2)

## 2023-04-06 PROCEDURE — 84481 FREE ASSAY (FT-3): CPT | Performed by: PHYSICIAN ASSISTANT

## 2023-04-06 PROCEDURE — 99214 OFFICE O/P EST MOD 30 MIN: CPT | Performed by: PHYSICIAN ASSISTANT

## 2023-04-06 PROCEDURE — 84439 ASSAY OF FREE THYROXINE: CPT | Performed by: PHYSICIAN ASSISTANT

## 2023-04-06 PROCEDURE — 80053 COMPREHEN METABOLIC PANEL: CPT | Performed by: PHYSICIAN ASSISTANT

## 2023-04-06 PROCEDURE — 36415 COLL VENOUS BLD VENIPUNCTURE: CPT | Performed by: PHYSICIAN ASSISTANT

## 2023-04-06 PROCEDURE — 86376 MICROSOMAL ANTIBODY EACH: CPT | Performed by: PHYSICIAN ASSISTANT

## 2023-04-06 PROCEDURE — 83036 HEMOGLOBIN GLYCOSYLATED A1C: CPT | Performed by: PHYSICIAN ASSISTANT

## 2023-04-06 PROCEDURE — 84443 ASSAY THYROID STIM HORMONE: CPT | Performed by: PHYSICIAN ASSISTANT

## 2023-04-06 ASSESSMENT — ENCOUNTER SYMPTOMS: NUMBNESS: 1

## 2023-04-06 NOTE — LETTER
April 10, 2023      Kenneth Quinterodiamante  [address] [facility] [address]        Dear ,    We are writing to inform you of your test results.    Thyroid antibodies were just slightly elevated but still much improved compared to when you were first diagnosed. Please let me know if you have any questions/concerns.    Resulted Orders   Thyroid peroxidase antibody   Result Value Ref Range    Thyroid Peroxidase Antibody 38 (H) <35 IU/mL       If you have any questions or concerns, please call the clinic at the number listed above.       Sincerely,      Desi Mojica PA-C

## 2023-04-07 LAB — THYROPEROXIDASE AB SERPL-ACNC: 38 IU/ML

## 2023-04-07 RX ORDER — LEVOTHYROXINE SODIUM 50 UG/1
50 TABLET ORAL DAILY
Qty: 90 TABLET | Refills: 0 | Status: SHIPPED | OUTPATIENT
Start: 2023-04-07 | End: 2023-07-18

## 2023-07-17 DIAGNOSIS — E06.3 HASHIMOTO'S THYROIDITIS: ICD-10-CM

## 2023-07-18 RX ORDER — LEVOTHYROXINE SODIUM 50 UG/1
TABLET ORAL
Qty: 90 TABLET | Refills: 2 | Status: SHIPPED | OUTPATIENT
Start: 2023-07-18 | End: 2024-04-26

## 2024-01-13 ENCOUNTER — HEALTH MAINTENANCE LETTER (OUTPATIENT)
Age: 54
End: 2024-01-13

## 2024-03-23 ENCOUNTER — HEALTH MAINTENANCE LETTER (OUTPATIENT)
Age: 54
End: 2024-03-23

## 2024-03-28 NOTE — TELEPHONE ENCOUNTER
Date of procedure: 10/7  Colonoscopy  Surgeon: Dr. Zamarripa  Prep:Miralax  Packet:Colonoscopy/EGD instructions were sent to the patient in [place].   Date: 9/28/2021      Surgery Scheduler    
Left message for return call to schedule surgery.              
Comment: LM: to the sebaceous hyperplasia below, it’s 1 CM
Detail Level: Simple
Render Risk Assessment In Note?: no

## 2024-04-26 DIAGNOSIS — E06.3 HASHIMOTO'S THYROIDITIS: ICD-10-CM

## 2024-04-26 RX ORDER — LEVOTHYROXINE SODIUM 50 UG/1
TABLET ORAL
Qty: 90 TABLET | Refills: 0 | Status: SHIPPED | OUTPATIENT
Start: 2024-04-26

## 2025-04-12 ENCOUNTER — HEALTH MAINTENANCE LETTER (OUTPATIENT)
Age: 55
End: 2025-04-12

## 2025-07-31 ENCOUNTER — PATIENT OUTREACH (OUTPATIENT)
Dept: CARE COORDINATION | Facility: CLINIC | Age: 55
End: 2025-07-31
Payer: COMMERCIAL